# Patient Record
Sex: FEMALE | Race: ASIAN | NOT HISPANIC OR LATINO | URBAN - METROPOLITAN AREA
[De-identification: names, ages, dates, MRNs, and addresses within clinical notes are randomized per-mention and may not be internally consistent; named-entity substitution may affect disease eponyms.]

---

## 2024-01-09 ENCOUNTER — INPATIENT (INPATIENT)
Facility: HOSPITAL | Age: 84
LOS: 8 days | Discharge: NURSING, REHAB OR LONG-TERM CARE | End: 2024-01-18
Attending: HOSPITALIST | Admitting: HOSPITALIST
Payer: MEDICARE

## 2024-01-09 VITALS
RESPIRATION RATE: 16 BRPM | TEMPERATURE: 99 F | OXYGEN SATURATION: 99 % | HEART RATE: 88 BPM | SYSTOLIC BLOOD PRESSURE: 123 MMHG | DIASTOLIC BLOOD PRESSURE: 67 MMHG

## 2024-01-09 DIAGNOSIS — M25.551 PAIN IN RIGHT HIP: ICD-10-CM

## 2024-01-09 DIAGNOSIS — Z95.1 PRESENCE OF AORTOCORONARY BYPASS GRAFT: Chronic | ICD-10-CM

## 2024-01-09 LAB
ALBUMIN SERPL ELPH-MCNC: 3.4 G/DL — SIGNIFICANT CHANGE UP (ref 3.3–5)
ALBUMIN SERPL ELPH-MCNC: 3.4 G/DL — SIGNIFICANT CHANGE UP (ref 3.3–5)
ALP SERPL-CCNC: 68 U/L — SIGNIFICANT CHANGE UP (ref 40–120)
ALP SERPL-CCNC: 68 U/L — SIGNIFICANT CHANGE UP (ref 40–120)
ALT FLD-CCNC: 15 U/L — SIGNIFICANT CHANGE UP (ref 4–33)
ALT FLD-CCNC: 15 U/L — SIGNIFICANT CHANGE UP (ref 4–33)
ANION GAP SERPL CALC-SCNC: 13 MMOL/L — SIGNIFICANT CHANGE UP (ref 7–14)
ANION GAP SERPL CALC-SCNC: 13 MMOL/L — SIGNIFICANT CHANGE UP (ref 7–14)
APPEARANCE UR: CLEAR — SIGNIFICANT CHANGE UP
APPEARANCE UR: CLEAR — SIGNIFICANT CHANGE UP
AST SERPL-CCNC: 44 U/L — HIGH (ref 4–32)
AST SERPL-CCNC: 44 U/L — HIGH (ref 4–32)
B PERT DNA SPEC QL NAA+PROBE: SIGNIFICANT CHANGE UP
B PERT DNA SPEC QL NAA+PROBE: SIGNIFICANT CHANGE UP
B PERT+PARAPERT DNA PNL SPEC NAA+PROBE: SIGNIFICANT CHANGE UP
B PERT+PARAPERT DNA PNL SPEC NAA+PROBE: SIGNIFICANT CHANGE UP
BACTERIA # UR AUTO: NEGATIVE /HPF — SIGNIFICANT CHANGE UP
BACTERIA # UR AUTO: NEGATIVE /HPF — SIGNIFICANT CHANGE UP
BASOPHILS # BLD AUTO: 0.01 K/UL — SIGNIFICANT CHANGE UP (ref 0–0.2)
BASOPHILS # BLD AUTO: 0.01 K/UL — SIGNIFICANT CHANGE UP (ref 0–0.2)
BASOPHILS NFR BLD AUTO: 0.1 % — SIGNIFICANT CHANGE UP (ref 0–2)
BASOPHILS NFR BLD AUTO: 0.1 % — SIGNIFICANT CHANGE UP (ref 0–2)
BILIRUB SERPL-MCNC: 0.4 MG/DL — SIGNIFICANT CHANGE UP (ref 0.2–1.2)
BILIRUB SERPL-MCNC: 0.4 MG/DL — SIGNIFICANT CHANGE UP (ref 0.2–1.2)
BILIRUB UR-MCNC: NEGATIVE — SIGNIFICANT CHANGE UP
BILIRUB UR-MCNC: NEGATIVE — SIGNIFICANT CHANGE UP
BORDETELLA PARAPERTUSSIS (RAPRVP): SIGNIFICANT CHANGE UP
BORDETELLA PARAPERTUSSIS (RAPRVP): SIGNIFICANT CHANGE UP
BUN SERPL-MCNC: 30 MG/DL — HIGH (ref 7–23)
BUN SERPL-MCNC: 30 MG/DL — HIGH (ref 7–23)
C PNEUM DNA SPEC QL NAA+PROBE: SIGNIFICANT CHANGE UP
C PNEUM DNA SPEC QL NAA+PROBE: SIGNIFICANT CHANGE UP
CALCIUM SERPL-MCNC: 8.9 MG/DL — SIGNIFICANT CHANGE UP (ref 8.4–10.5)
CALCIUM SERPL-MCNC: 8.9 MG/DL — SIGNIFICANT CHANGE UP (ref 8.4–10.5)
CAST: 10 /LPF — HIGH (ref 0–4)
CAST: 10 /LPF — HIGH (ref 0–4)
CHLORIDE SERPL-SCNC: 95 MMOL/L — LOW (ref 98–107)
CHLORIDE SERPL-SCNC: 95 MMOL/L — LOW (ref 98–107)
CO2 SERPL-SCNC: 20 MMOL/L — LOW (ref 22–31)
CO2 SERPL-SCNC: 20 MMOL/L — LOW (ref 22–31)
COLOR SPEC: YELLOW — SIGNIFICANT CHANGE UP
COLOR SPEC: YELLOW — SIGNIFICANT CHANGE UP
CREAT SERPL-MCNC: 1.52 MG/DL — HIGH (ref 0.5–1.3)
CREAT SERPL-MCNC: 1.52 MG/DL — HIGH (ref 0.5–1.3)
DIFF PNL FLD: NEGATIVE — SIGNIFICANT CHANGE UP
DIFF PNL FLD: NEGATIVE — SIGNIFICANT CHANGE UP
EGFR: 34 ML/MIN/1.73M2 — LOW
EGFR: 34 ML/MIN/1.73M2 — LOW
EOSINOPHIL # BLD AUTO: 0.09 K/UL — SIGNIFICANT CHANGE UP (ref 0–0.5)
EOSINOPHIL # BLD AUTO: 0.09 K/UL — SIGNIFICANT CHANGE UP (ref 0–0.5)
EOSINOPHIL NFR BLD AUTO: 0.8 % — SIGNIFICANT CHANGE UP (ref 0–6)
EOSINOPHIL NFR BLD AUTO: 0.8 % — SIGNIFICANT CHANGE UP (ref 0–6)
FLUAV SUBTYP SPEC NAA+PROBE: SIGNIFICANT CHANGE UP
FLUAV SUBTYP SPEC NAA+PROBE: SIGNIFICANT CHANGE UP
FLUBV RNA SPEC QL NAA+PROBE: SIGNIFICANT CHANGE UP
FLUBV RNA SPEC QL NAA+PROBE: SIGNIFICANT CHANGE UP
GLUCOSE SERPL-MCNC: 142 MG/DL — HIGH (ref 70–99)
GLUCOSE SERPL-MCNC: 142 MG/DL — HIGH (ref 70–99)
GLUCOSE UR QL: NEGATIVE MG/DL — SIGNIFICANT CHANGE UP
GLUCOSE UR QL: NEGATIVE MG/DL — SIGNIFICANT CHANGE UP
HADV DNA SPEC QL NAA+PROBE: SIGNIFICANT CHANGE UP
HADV DNA SPEC QL NAA+PROBE: SIGNIFICANT CHANGE UP
HCOV 229E RNA SPEC QL NAA+PROBE: SIGNIFICANT CHANGE UP
HCOV 229E RNA SPEC QL NAA+PROBE: SIGNIFICANT CHANGE UP
HCOV HKU1 RNA SPEC QL NAA+PROBE: SIGNIFICANT CHANGE UP
HCOV HKU1 RNA SPEC QL NAA+PROBE: SIGNIFICANT CHANGE UP
HCOV NL63 RNA SPEC QL NAA+PROBE: SIGNIFICANT CHANGE UP
HCOV NL63 RNA SPEC QL NAA+PROBE: SIGNIFICANT CHANGE UP
HCOV OC43 RNA SPEC QL NAA+PROBE: SIGNIFICANT CHANGE UP
HCOV OC43 RNA SPEC QL NAA+PROBE: SIGNIFICANT CHANGE UP
HCT VFR BLD CALC: 26.3 % — LOW (ref 34.5–45)
HCT VFR BLD CALC: 26.3 % — LOW (ref 34.5–45)
HGB BLD-MCNC: 8.6 G/DL — LOW (ref 11.5–15.5)
HGB BLD-MCNC: 8.6 G/DL — LOW (ref 11.5–15.5)
HMPV RNA SPEC QL NAA+PROBE: SIGNIFICANT CHANGE UP
HMPV RNA SPEC QL NAA+PROBE: SIGNIFICANT CHANGE UP
HPIV1 RNA SPEC QL NAA+PROBE: SIGNIFICANT CHANGE UP
HPIV1 RNA SPEC QL NAA+PROBE: SIGNIFICANT CHANGE UP
HPIV2 RNA SPEC QL NAA+PROBE: SIGNIFICANT CHANGE UP
HPIV2 RNA SPEC QL NAA+PROBE: SIGNIFICANT CHANGE UP
HPIV3 RNA SPEC QL NAA+PROBE: SIGNIFICANT CHANGE UP
HPIV3 RNA SPEC QL NAA+PROBE: SIGNIFICANT CHANGE UP
HPIV4 RNA SPEC QL NAA+PROBE: SIGNIFICANT CHANGE UP
HPIV4 RNA SPEC QL NAA+PROBE: SIGNIFICANT CHANGE UP
IANC: 8.02 K/UL — HIGH (ref 1.8–7.4)
IANC: 8.02 K/UL — HIGH (ref 1.8–7.4)
IMM GRANULOCYTES NFR BLD AUTO: 0.6 % — SIGNIFICANT CHANGE UP (ref 0–0.9)
IMM GRANULOCYTES NFR BLD AUTO: 0.6 % — SIGNIFICANT CHANGE UP (ref 0–0.9)
KETONES UR-MCNC: NEGATIVE MG/DL — SIGNIFICANT CHANGE UP
KETONES UR-MCNC: NEGATIVE MG/DL — SIGNIFICANT CHANGE UP
LEUKOCYTE ESTERASE UR-ACNC: NEGATIVE — SIGNIFICANT CHANGE UP
LEUKOCYTE ESTERASE UR-ACNC: NEGATIVE — SIGNIFICANT CHANGE UP
LYMPHOCYTES # BLD AUTO: 0.82 K/UL — LOW (ref 1–3.3)
LYMPHOCYTES # BLD AUTO: 0.82 K/UL — LOW (ref 1–3.3)
LYMPHOCYTES # BLD AUTO: 7.7 % — LOW (ref 13–44)
LYMPHOCYTES # BLD AUTO: 7.7 % — LOW (ref 13–44)
M PNEUMO DNA SPEC QL NAA+PROBE: SIGNIFICANT CHANGE UP
M PNEUMO DNA SPEC QL NAA+PROBE: SIGNIFICANT CHANGE UP
MAGNESIUM SERPL-MCNC: 2.2 MG/DL — SIGNIFICANT CHANGE UP (ref 1.6–2.6)
MAGNESIUM SERPL-MCNC: 2.2 MG/DL — SIGNIFICANT CHANGE UP (ref 1.6–2.6)
MCHC RBC-ENTMCNC: 31.3 PG — SIGNIFICANT CHANGE UP (ref 27–34)
MCHC RBC-ENTMCNC: 31.3 PG — SIGNIFICANT CHANGE UP (ref 27–34)
MCHC RBC-ENTMCNC: 32.7 GM/DL — SIGNIFICANT CHANGE UP (ref 32–36)
MCHC RBC-ENTMCNC: 32.7 GM/DL — SIGNIFICANT CHANGE UP (ref 32–36)
MCV RBC AUTO: 95.6 FL — SIGNIFICANT CHANGE UP (ref 80–100)
MCV RBC AUTO: 95.6 FL — SIGNIFICANT CHANGE UP (ref 80–100)
MONOCYTES # BLD AUTO: 1.6 K/UL — HIGH (ref 0–0.9)
MONOCYTES # BLD AUTO: 1.6 K/UL — HIGH (ref 0–0.9)
MONOCYTES NFR BLD AUTO: 15.1 % — HIGH (ref 2–14)
MONOCYTES NFR BLD AUTO: 15.1 % — HIGH (ref 2–14)
NEUTROPHILS # BLD AUTO: 8.02 K/UL — HIGH (ref 1.8–7.4)
NEUTROPHILS # BLD AUTO: 8.02 K/UL — HIGH (ref 1.8–7.4)
NEUTROPHILS NFR BLD AUTO: 75.7 % — SIGNIFICANT CHANGE UP (ref 43–77)
NEUTROPHILS NFR BLD AUTO: 75.7 % — SIGNIFICANT CHANGE UP (ref 43–77)
NITRITE UR-MCNC: NEGATIVE — SIGNIFICANT CHANGE UP
NITRITE UR-MCNC: NEGATIVE — SIGNIFICANT CHANGE UP
NRBC # BLD: 0 /100 WBCS — SIGNIFICANT CHANGE UP (ref 0–0)
NRBC # BLD: 0 /100 WBCS — SIGNIFICANT CHANGE UP (ref 0–0)
NRBC # FLD: 0 K/UL — SIGNIFICANT CHANGE UP (ref 0–0)
NRBC # FLD: 0 K/UL — SIGNIFICANT CHANGE UP (ref 0–0)
PH UR: 6 — SIGNIFICANT CHANGE UP (ref 5–8)
PH UR: 6 — SIGNIFICANT CHANGE UP (ref 5–8)
PLATELET # BLD AUTO: 192 K/UL — SIGNIFICANT CHANGE UP (ref 150–400)
PLATELET # BLD AUTO: 192 K/UL — SIGNIFICANT CHANGE UP (ref 150–400)
POTASSIUM SERPL-MCNC: 4.6 MMOL/L — SIGNIFICANT CHANGE UP (ref 3.5–5.3)
POTASSIUM SERPL-MCNC: 4.6 MMOL/L — SIGNIFICANT CHANGE UP (ref 3.5–5.3)
POTASSIUM SERPL-SCNC: 4.6 MMOL/L — SIGNIFICANT CHANGE UP (ref 3.5–5.3)
POTASSIUM SERPL-SCNC: 4.6 MMOL/L — SIGNIFICANT CHANGE UP (ref 3.5–5.3)
PROT SERPL-MCNC: 8.1 G/DL — SIGNIFICANT CHANGE UP (ref 6–8.3)
PROT SERPL-MCNC: 8.1 G/DL — SIGNIFICANT CHANGE UP (ref 6–8.3)
PROT UR-MCNC: 30 MG/DL
PROT UR-MCNC: 30 MG/DL
RAPID RVP RESULT: SIGNIFICANT CHANGE UP
RAPID RVP RESULT: SIGNIFICANT CHANGE UP
RBC # BLD: 2.75 M/UL — LOW (ref 3.8–5.2)
RBC # BLD: 2.75 M/UL — LOW (ref 3.8–5.2)
RBC # FLD: 13 % — SIGNIFICANT CHANGE UP (ref 10.3–14.5)
RBC # FLD: 13 % — SIGNIFICANT CHANGE UP (ref 10.3–14.5)
RBC CASTS # UR COMP ASSIST: 0 /HPF — SIGNIFICANT CHANGE UP (ref 0–4)
RBC CASTS # UR COMP ASSIST: 0 /HPF — SIGNIFICANT CHANGE UP (ref 0–4)
REVIEW: SIGNIFICANT CHANGE UP
REVIEW: SIGNIFICANT CHANGE UP
RSV RNA SPEC QL NAA+PROBE: SIGNIFICANT CHANGE UP
RSV RNA SPEC QL NAA+PROBE: SIGNIFICANT CHANGE UP
RV+EV RNA SPEC QL NAA+PROBE: SIGNIFICANT CHANGE UP
RV+EV RNA SPEC QL NAA+PROBE: SIGNIFICANT CHANGE UP
SARS-COV-2 RNA SPEC QL NAA+PROBE: SIGNIFICANT CHANGE UP
SARS-COV-2 RNA SPEC QL NAA+PROBE: SIGNIFICANT CHANGE UP
SODIUM SERPL-SCNC: 128 MMOL/L — LOW (ref 135–145)
SODIUM SERPL-SCNC: 128 MMOL/L — LOW (ref 135–145)
SP GR SPEC: 1.01 — SIGNIFICANT CHANGE UP (ref 1–1.03)
SP GR SPEC: 1.01 — SIGNIFICANT CHANGE UP (ref 1–1.03)
SQUAMOUS # UR AUTO: 1 /HPF — SIGNIFICANT CHANGE UP (ref 0–5)
SQUAMOUS # UR AUTO: 1 /HPF — SIGNIFICANT CHANGE UP (ref 0–5)
UROBILINOGEN FLD QL: 0.2 MG/DL — SIGNIFICANT CHANGE UP (ref 0.2–1)
UROBILINOGEN FLD QL: 0.2 MG/DL — SIGNIFICANT CHANGE UP (ref 0.2–1)
WBC # BLD: 10.6 K/UL — HIGH (ref 3.8–10.5)
WBC # BLD: 10.6 K/UL — HIGH (ref 3.8–10.5)
WBC # FLD AUTO: 10.6 K/UL — HIGH (ref 3.8–10.5)
WBC # FLD AUTO: 10.6 K/UL — HIGH (ref 3.8–10.5)
WBC UR QL: 0 /HPF — SIGNIFICANT CHANGE UP (ref 0–5)
WBC UR QL: 0 /HPF — SIGNIFICANT CHANGE UP (ref 0–5)

## 2024-01-09 PROCEDURE — 99285 EMERGENCY DEPT VISIT HI MDM: CPT

## 2024-01-09 PROCEDURE — 73562 X-RAY EXAM OF KNEE 3: CPT | Mod: 26,50

## 2024-01-09 PROCEDURE — 72170 X-RAY EXAM OF PELVIS: CPT | Mod: 26

## 2024-01-09 PROCEDURE — 73564 X-RAY EXAM KNEE 4 OR MORE: CPT | Mod: 26,50

## 2024-01-09 PROCEDURE — 72192 CT PELVIS W/O DYE: CPT | Mod: 26,MA

## 2024-01-09 PROCEDURE — 99223 1ST HOSP IP/OBS HIGH 75: CPT

## 2024-01-09 PROCEDURE — 71045 X-RAY EXAM CHEST 1 VIEW: CPT | Mod: 26

## 2024-01-09 RX ORDER — LIDOCAINE 4 G/100G
1 CREAM TOPICAL ONCE
Refills: 0 | Status: COMPLETED | OUTPATIENT
Start: 2024-01-09 | End: 2024-01-09

## 2024-01-09 RX ORDER — SODIUM CHLORIDE 9 MG/ML
1000 INJECTION INTRAMUSCULAR; INTRAVENOUS; SUBCUTANEOUS ONCE
Refills: 0 | Status: COMPLETED | OUTPATIENT
Start: 2024-01-09 | End: 2024-01-09

## 2024-01-09 RX ORDER — ACETAMINOPHEN 500 MG
1000 TABLET ORAL ONCE
Refills: 0 | Status: COMPLETED | OUTPATIENT
Start: 2024-01-09 | End: 2024-01-09

## 2024-01-09 RX ADMIN — SODIUM CHLORIDE 1000 MILLILITER(S): 9 INJECTION INTRAMUSCULAR; INTRAVENOUS; SUBCUTANEOUS at 17:38

## 2024-01-09 RX ADMIN — LIDOCAINE 1 PATCH: 4 CREAM TOPICAL at 16:19

## 2024-01-09 RX ADMIN — Medication 400 MILLIGRAM(S): at 16:19

## 2024-01-09 NOTE — ED PROVIDER NOTE - CLINICAL SUMMARY MEDICAL DECISION MAKING FREE TEXT BOX
83-year-old female presenting to the emergency department with concern for 1 month of worsening bilateral hip pain which was followed by bilateral knee pain, and subsequently generalized weakness and inability to ambulate as of today.  Also was diagnosed with COVID approximately 1 week ago with cough and congestion.  On exam is generally weak with reproducible pain in the hips and knees on ranging however no deformities no swelling no redness to joints, no limit to range, no localizing neurodeficit.  Given significant change in capacity to achieve ADLs we will check labs urine viral swab x-rays, To eval for DIF including but not limited to infections electrolyte disturbance organ dysfunction fractures, CT to eval for occult hip fractures, denies any particular trauma that she can recall.  Patient agreeable to plan.

## 2024-01-09 NOTE — H&P ADULT - PROBLEM SELECTOR PLAN 1
Likely osteoarthritis, but cannot exclude inflammatory condition.    - will check DONTAE, dsDNA, RF, and CCP ab.  - PT eval  - PRN ibuprofen  - Possible rheumatology consult in am

## 2024-01-09 NOTE — ED PROVIDER NOTE - OBJECTIVE STATEMENT
83-year-old female history of CABG, presenting to the emergency department with concern for generalized weakness and hip/knee pain.  Per patient and daughter, approximately 1 month ago patient began to notice bilateral hip pain, became progressively worse, was treated with Motrin and Tylenol, has seen improvement with Motrin with Tylenol.  Over the last few days pain worsened significantly now also felt in the bilateral knees, today was unable to walk.  Patient and daughter report up until a few days ago was completing all of her ADLs, fully ambulatory without assist and lives with her  alone.  Reports last week was diagnosed with COVID and has had persistent cough and congestion ever since then.  Had a repeat COVID test today at home which was negative.  No fevers though does report chills, no shortness of breath or chest pain, no abdominal pain vomiting or diarrhea.  No edema, no recent falls, no numbness, headache or vision changes.

## 2024-01-09 NOTE — H&P ADULT - NSHPLABSRESULTS_GEN_ALL_CORE
8.6    10.60 )-----------( 192      ( 2024 16:39 )             26.3     128<L>  |  95<L>  |  30<H>  ----------------------------<  142<H>       4.6   |  20<L>  |  1.52<H>    Ca    8.9      2024 16:39  Mg     2.20         TPro  8.1  /  Alb  3.4  /  TBili  0.4  /  DBili  x   /  AST  44<H>  /  ALT  15  /  AlkPhos  68                              Urinalysis Basic - ( 2024 22:00 )  Color: Yellow / Appearance: Clear / S.012 / pH: 6.0  Gluc: Negative mg/dL / Ketone: Negative mg/dL  / Bili: Negative / Urobili: 0.2 mg/dL   Blood: Negative / Protein: 30 mg/dL / Nitrite: Negative   Leuk Esterase: Negative / RBC: 0 /HPF / WBC 0 /HPF   Sq Epi: x / Non Sq Epi: x / Bacteria: Negative /HPF        < from: CT Pelvis Bony Only No Cont (24 @ 17:06) >    No fracture.  Degenerative changes.  Small erosion at the left adductor insertion at the left anterior greater   trochanter. Possibly the sequela of remote injury or inflammatory   arthropathy.    < end of copied text >

## 2024-01-09 NOTE — ED PROVIDER NOTE - PHYSICAL EXAMINATION
GEN - NAD; thin, nontoxic; A+O x3   HEAD - NC/AT   EYES- PERRL, EOMI  ENT: Airway patent, mmm, Oral cavity and pharynx normal. No inflammation, swelling, exudate, or lesions.    NECK: Neck supple  PULMONARY - CTA b/l, symmetric breath sounds, unlabored.   CARDIAC -s1s2, RRR, no M,G,R  ABDOMEN - +BS, ND, NT, soft, no guarding  BACK - no CVA tenderness, Normal  spine   EXTREMITIES - FROM to b/l ue and le, hips/pelvis stable, +ttp to r. and l. si joints, from to hips/knees b/l, ext mechs intact b/l, neg ant/post drawer b/l, no laxity to joints, Pain reproduced to hips and knees on ranging. symmetric pulses, capillary refill < 2 seconds, no edema   SKIN - no rash or bruising   NEUROLOGIC - ao3, face symmetric, speech clear, sensory intact, generally weak though moving all ext equally, unable to bear weight 2/2 pain/generalized weakness

## 2024-01-09 NOTE — ED ADULT NURSE NOTE - NSFALLRISKINTERV_ED_ALL_ED
Assistance OOB with selected safe patient handling equipment if applicable/Assistance with ambulation/Communicate fall risk and risk factors to all staff, patient, and family/Provide visual cue: yellow wristband, yellow gown, etc/Reinforce activity limits and safety measures with patient and family/Call bell, personal items and telephone in reach/Instruct patient to call for assistance before getting out of bed/chair/stretcher/Non-slip footwear applied when patient is off stretcher/[place] to call system/Physically safe environment - no spills, clutter or unnecessary equipment/Purposeful Proactive Rounding/Room/bathroom lighting operational, light cord in reach Assistance OOB with selected safe patient handling equipment if applicable/Assistance with ambulation/Communicate fall risk and risk factors to all staff, patient, and family/Provide visual cue: yellow wristband, yellow gown, etc/Reinforce activity limits and safety measures with patient and family/Call bell, personal items and telephone in reach/Instruct patient to call for assistance before getting out of bed/chair/stretcher/Non-slip footwear applied when patient is off stretcher/[place] to call system/Physically safe environment - no spills, clutter or unnecessary equipment/Purposeful Proactive Rounding/Room/bathroom lighting operational, light cord in reach

## 2024-01-09 NOTE — ED ADULT NURSE NOTE - OBJECTIVE STATEMENT
Pt is a 84 y/o Female, A&Ox4, ambulatory at baseline with a PHx of CABG, presenting to the ED with c/o b/l knee and hip pain x 1 month. Pt's daughter at bedside states pain worsening over past few days. Pt reports difficulty ambulating and reports being carried into car today by grandson. Pt jumps when palpating knees. Swelling noted to left knee when compared to right knee. Respirations even and unlabored, chest rise equal b/l. Pt denies chest pain, SOB, fever, cough, chills, abdominal pain, N/V/D, h/a, dizziness, numbness/tingling or any urinary symptoms at this time. No acute distress noted. 22g IVL placed in LAC. Labs collected and sent. Medication administered as ordered, see MAR. Safety maintained throughout.

## 2024-01-09 NOTE — ED ADULT TRIAGE NOTE - CHIEF COMPLAINT QUOTE
pt c/o lower back pain, b/l hip and  b/l lower extremity x 1 month. pt states pain is partially relieved with motrin. pt denies injury. reports difficulty ambulating due to pain.

## 2024-01-09 NOTE — H&P ADULT - NSHPPHYSICALEXAM_GEN_ALL_CORE
Vital Signs Last 24 Hrs  T(C): 36.9 (10 Kwame 2024 00:11), Max: 37 (09 Jan 2024 14:17)  T(F): 98.5 (10 Kwame 2024 00:11), Max: 98.6 (09 Jan 2024 14:17)  HR: 89 (10 Kwame 2024 00:11) (88 - 89)  BP: 135/61 (10 Kwame 2024 00:11) (123/67 - 135/61)  BP(mean): --  RR: 18 (10 Kwame 2024 00:11) (16 - 18)  SpO2: 99% (10 Kwame 2024 00:11) (99% - 99%)    Parameters below as of 10 Kwame 2024 00:11  Patient On (Oxygen Delivery Method): room air        PHYSICAL EXAM:  GENERAL: No Acute Distress  EYES: conjunctiva and sclera clear  ENMT: Moist mucous membranes   NECK: Supple  PULMONARY: Clear to auscultation bilaterally  CARDIAC: Regular rate and rhythm; No murmurs, rubs, or gallops  GASTROINTESTINAL: Abdomen soft, Nontender, Nondistended; Bowel sounds normal  EXTREMITIES:   No clubbing, cyanosis, or pedal edema  PSYCH: Normal Affect, Normal Behavior  NEUROLOGY:   - Mental status A&O x 3  SKIN: No rashes or lesions  MUSCULOSKELETAL: bilateral knee tenderness and effusion

## 2024-01-09 NOTE — H&P ADULT - HISTORY OF PRESENT ILLNESS
84 y/o  F with CAD s/p CABG, HLD, and chronic hep B p/w bilateral knee pain.  Pt reports bilateral hip pain for about 1 month.  She saw her PMD and was referred to an orthopedist.  Per daughter, pt had imaging and testing for autoimmune diseases, but was told the tests were normal.  Over the past few days, pt has had worsening knee pain with swelling of bilateral knees.  She has difficulty standing and walking 2/2 the pain, and has had difficulty getting around her home to perform ADL's.  She reports the joints are not warm, and she has not been having fever or chills.  She takes ibuprofen which provides partial relief.  Pt has history of similar shoulder pain in the past.  Daughter also states pt recently saw a dermatologist for rash on her arms and face, but is unsure about the diagnosis.      In the ED, pt was given tylenol and lidocaine patch.

## 2024-01-10 DIAGNOSIS — B19.10 UNSPECIFIED VIRAL HEPATITIS B WITHOUT HEPATIC COMA: ICD-10-CM

## 2024-01-10 DIAGNOSIS — E87.1 HYPO-OSMOLALITY AND HYPONATREMIA: ICD-10-CM

## 2024-01-10 DIAGNOSIS — I25.10 ATHEROSCLEROTIC HEART DISEASE OF NATIVE CORONARY ARTERY WITHOUT ANGINA PECTORIS: ICD-10-CM

## 2024-01-10 DIAGNOSIS — M25.561 PAIN IN RIGHT KNEE: ICD-10-CM

## 2024-01-10 DIAGNOSIS — N28.9 DISORDER OF KIDNEY AND URETER, UNSPECIFIED: ICD-10-CM

## 2024-01-10 DIAGNOSIS — N17.9 ACUTE KIDNEY FAILURE, UNSPECIFIED: ICD-10-CM

## 2024-01-10 LAB
ANION GAP SERPL CALC-SCNC: 11 MMOL/L — SIGNIFICANT CHANGE UP (ref 7–14)
ANION GAP SERPL CALC-SCNC: 11 MMOL/L — SIGNIFICANT CHANGE UP (ref 7–14)
BASOPHILS # BLD AUTO: 0.02 K/UL — SIGNIFICANT CHANGE UP (ref 0–0.2)
BASOPHILS # BLD AUTO: 0.02 K/UL — SIGNIFICANT CHANGE UP (ref 0–0.2)
BASOPHILS NFR BLD AUTO: 0.2 % — SIGNIFICANT CHANGE UP (ref 0–2)
BASOPHILS NFR BLD AUTO: 0.2 % — SIGNIFICANT CHANGE UP (ref 0–2)
BUN SERPL-MCNC: 24 MG/DL — HIGH (ref 7–23)
BUN SERPL-MCNC: 24 MG/DL — HIGH (ref 7–23)
CALCIUM SERPL-MCNC: 8.5 MG/DL — SIGNIFICANT CHANGE UP (ref 8.4–10.5)
CALCIUM SERPL-MCNC: 8.5 MG/DL — SIGNIFICANT CHANGE UP (ref 8.4–10.5)
CHLORIDE SERPL-SCNC: 100 MMOL/L — SIGNIFICANT CHANGE UP (ref 98–107)
CHLORIDE SERPL-SCNC: 100 MMOL/L — SIGNIFICANT CHANGE UP (ref 98–107)
CO2 SERPL-SCNC: 21 MMOL/L — LOW (ref 22–31)
CO2 SERPL-SCNC: 21 MMOL/L — LOW (ref 22–31)
CREAT SERPL-MCNC: 1.45 MG/DL — HIGH (ref 0.5–1.3)
CREAT SERPL-MCNC: 1.45 MG/DL — HIGH (ref 0.5–1.3)
DSDNA AB SER-ACNC: <12 IU/ML — SIGNIFICANT CHANGE UP
DSDNA AB SER-ACNC: <12 IU/ML — SIGNIFICANT CHANGE UP
EGFR: 36 ML/MIN/1.73M2 — LOW
EGFR: 36 ML/MIN/1.73M2 — LOW
EOSINOPHIL # BLD AUTO: 0.2 K/UL — SIGNIFICANT CHANGE UP (ref 0–0.5)
EOSINOPHIL # BLD AUTO: 0.2 K/UL — SIGNIFICANT CHANGE UP (ref 0–0.5)
EOSINOPHIL NFR BLD AUTO: 1.7 % — SIGNIFICANT CHANGE UP (ref 0–6)
EOSINOPHIL NFR BLD AUTO: 1.7 % — SIGNIFICANT CHANGE UP (ref 0–6)
GLUCOSE SERPL-MCNC: 134 MG/DL — HIGH (ref 70–99)
GLUCOSE SERPL-MCNC: 134 MG/DL — HIGH (ref 70–99)
HCT VFR BLD CALC: 25.9 % — LOW (ref 34.5–45)
HCT VFR BLD CALC: 25.9 % — LOW (ref 34.5–45)
HGB BLD-MCNC: 8.6 G/DL — LOW (ref 11.5–15.5)
HGB BLD-MCNC: 8.6 G/DL — LOW (ref 11.5–15.5)
IANC: 9.23 K/UL — HIGH (ref 1.8–7.4)
IANC: 9.23 K/UL — HIGH (ref 1.8–7.4)
IMM GRANULOCYTES NFR BLD AUTO: 0.6 % — SIGNIFICANT CHANGE UP (ref 0–0.9)
IMM GRANULOCYTES NFR BLD AUTO: 0.6 % — SIGNIFICANT CHANGE UP (ref 0–0.9)
LYMPHOCYTES # BLD AUTO: 0.99 K/UL — LOW (ref 1–3.3)
LYMPHOCYTES # BLD AUTO: 0.99 K/UL — LOW (ref 1–3.3)
LYMPHOCYTES # BLD AUTO: 8.3 % — LOW (ref 13–44)
LYMPHOCYTES # BLD AUTO: 8.3 % — LOW (ref 13–44)
MAGNESIUM SERPL-MCNC: 2 MG/DL — SIGNIFICANT CHANGE UP (ref 1.6–2.6)
MAGNESIUM SERPL-MCNC: 2 MG/DL — SIGNIFICANT CHANGE UP (ref 1.6–2.6)
MCHC RBC-ENTMCNC: 31.2 PG — SIGNIFICANT CHANGE UP (ref 27–34)
MCHC RBC-ENTMCNC: 31.2 PG — SIGNIFICANT CHANGE UP (ref 27–34)
MCHC RBC-ENTMCNC: 33.2 GM/DL — SIGNIFICANT CHANGE UP (ref 32–36)
MCHC RBC-ENTMCNC: 33.2 GM/DL — SIGNIFICANT CHANGE UP (ref 32–36)
MCV RBC AUTO: 93.8 FL — SIGNIFICANT CHANGE UP (ref 80–100)
MCV RBC AUTO: 93.8 FL — SIGNIFICANT CHANGE UP (ref 80–100)
MONOCYTES # BLD AUTO: 1.4 K/UL — HIGH (ref 0–0.9)
MONOCYTES # BLD AUTO: 1.4 K/UL — HIGH (ref 0–0.9)
MONOCYTES NFR BLD AUTO: 11.8 % — SIGNIFICANT CHANGE UP (ref 2–14)
MONOCYTES NFR BLD AUTO: 11.8 % — SIGNIFICANT CHANGE UP (ref 2–14)
NEUTROPHILS # BLD AUTO: 9.23 K/UL — HIGH (ref 1.8–7.4)
NEUTROPHILS # BLD AUTO: 9.23 K/UL — HIGH (ref 1.8–7.4)
NEUTROPHILS NFR BLD AUTO: 77.4 % — HIGH (ref 43–77)
NEUTROPHILS NFR BLD AUTO: 77.4 % — HIGH (ref 43–77)
NRBC # BLD: 0 /100 WBCS — SIGNIFICANT CHANGE UP (ref 0–0)
NRBC # BLD: 0 /100 WBCS — SIGNIFICANT CHANGE UP (ref 0–0)
NRBC # FLD: 0 K/UL — SIGNIFICANT CHANGE UP (ref 0–0)
NRBC # FLD: 0 K/UL — SIGNIFICANT CHANGE UP (ref 0–0)
PHOSPHATE SERPL-MCNC: 3 MG/DL — SIGNIFICANT CHANGE UP (ref 2.5–4.5)
PHOSPHATE SERPL-MCNC: 3 MG/DL — SIGNIFICANT CHANGE UP (ref 2.5–4.5)
PLATELET # BLD AUTO: 203 K/UL — SIGNIFICANT CHANGE UP (ref 150–400)
PLATELET # BLD AUTO: 203 K/UL — SIGNIFICANT CHANGE UP (ref 150–400)
POTASSIUM SERPL-MCNC: 4.6 MMOL/L — SIGNIFICANT CHANGE UP (ref 3.5–5.3)
POTASSIUM SERPL-MCNC: 4.6 MMOL/L — SIGNIFICANT CHANGE UP (ref 3.5–5.3)
POTASSIUM SERPL-SCNC: 4.6 MMOL/L — SIGNIFICANT CHANGE UP (ref 3.5–5.3)
POTASSIUM SERPL-SCNC: 4.6 MMOL/L — SIGNIFICANT CHANGE UP (ref 3.5–5.3)
RBC # BLD: 2.76 M/UL — LOW (ref 3.8–5.2)
RBC # BLD: 2.76 M/UL — LOW (ref 3.8–5.2)
RBC # FLD: 13.2 % — SIGNIFICANT CHANGE UP (ref 10.3–14.5)
RBC # FLD: 13.2 % — SIGNIFICANT CHANGE UP (ref 10.3–14.5)
RHEUMATOID FACT SERPL-ACNC: 13 IU/ML — SIGNIFICANT CHANGE UP (ref 0–13)
RHEUMATOID FACT SERPL-ACNC: 13 IU/ML — SIGNIFICANT CHANGE UP (ref 0–13)
SODIUM SERPL-SCNC: 132 MMOL/L — LOW (ref 135–145)
SODIUM SERPL-SCNC: 132 MMOL/L — LOW (ref 135–145)
WBC # BLD: 11.91 K/UL — HIGH (ref 3.8–10.5)
WBC # BLD: 11.91 K/UL — HIGH (ref 3.8–10.5)
WBC # FLD AUTO: 11.91 K/UL — HIGH (ref 3.8–10.5)
WBC # FLD AUTO: 11.91 K/UL — HIGH (ref 3.8–10.5)

## 2024-01-10 PROCEDURE — 99232 SBSQ HOSP IP/OBS MODERATE 35: CPT

## 2024-01-10 RX ORDER — FAMOTIDINE 10 MG/ML
20 INJECTION INTRAVENOUS DAILY
Refills: 0 | Status: DISCONTINUED | OUTPATIENT
Start: 2024-01-10 | End: 2024-01-18

## 2024-01-10 RX ORDER — ENTECAVIR 0.5 MG/1
0.5 TABLET ORAL DAILY
Refills: 0 | Status: DISCONTINUED | OUTPATIENT
Start: 2024-01-10 | End: 2024-01-10

## 2024-01-10 RX ORDER — SODIUM CHLORIDE 9 MG/ML
1000 INJECTION INTRAMUSCULAR; INTRAVENOUS; SUBCUTANEOUS
Refills: 0 | Status: DISCONTINUED | OUTPATIENT
Start: 2024-01-10 | End: 2024-01-13

## 2024-01-10 RX ORDER — ENTECAVIR 0.5 MG/1
0.5 TABLET ORAL
Refills: 0 | Status: DISCONTINUED | OUTPATIENT
Start: 2024-01-10 | End: 2024-01-18

## 2024-01-10 RX ORDER — POLYETHYLENE GLYCOL 3350 17 G/17G
17 POWDER, FOR SOLUTION ORAL DAILY
Refills: 0 | Status: DISCONTINUED | OUTPATIENT
Start: 2024-01-10 | End: 2024-01-18

## 2024-01-10 RX ORDER — LANOLIN ALCOHOL/MO/W.PET/CERES
3 CREAM (GRAM) TOPICAL AT BEDTIME
Refills: 0 | Status: DISCONTINUED | OUTPATIENT
Start: 2024-01-10 | End: 2024-01-18

## 2024-01-10 RX ORDER — ATORVASTATIN CALCIUM 80 MG/1
80 TABLET, FILM COATED ORAL AT BEDTIME
Refills: 0 | Status: DISCONTINUED | OUTPATIENT
Start: 2024-01-10 | End: 2024-01-18

## 2024-01-10 RX ORDER — ACETAMINOPHEN 500 MG
650 TABLET ORAL EVERY 6 HOURS
Refills: 0 | Status: DISCONTINUED | OUTPATIENT
Start: 2024-01-10 | End: 2024-01-18

## 2024-01-10 RX ORDER — ASPIRIN/CALCIUM CARB/MAGNESIUM 324 MG
81 TABLET ORAL DAILY
Refills: 0 | Status: DISCONTINUED | OUTPATIENT
Start: 2024-01-10 | End: 2024-01-18

## 2024-01-10 RX ORDER — ERGOCALCIFEROL 1.25 MG/1
1 CAPSULE ORAL
Refills: 0 | DISCHARGE

## 2024-01-10 RX ORDER — IBUPROFEN 200 MG
400 TABLET ORAL THREE TIMES A DAY
Refills: 0 | Status: DISCONTINUED | OUTPATIENT
Start: 2024-01-10 | End: 2024-01-10

## 2024-01-10 RX ADMIN — Medication 81 MILLIGRAM(S): at 10:42

## 2024-01-10 RX ADMIN — ENTECAVIR 0.5 MILLIGRAM(S): 0.5 TABLET ORAL at 06:31

## 2024-01-10 RX ADMIN — SODIUM CHLORIDE 75 MILLILITER(S): 9 INJECTION INTRAMUSCULAR; INTRAVENOUS; SUBCUTANEOUS at 22:03

## 2024-01-10 RX ADMIN — Medication 650 MILLIGRAM(S): at 06:31

## 2024-01-10 RX ADMIN — ATORVASTATIN CALCIUM 80 MILLIGRAM(S): 80 TABLET, FILM COATED ORAL at 23:34

## 2024-01-10 RX ADMIN — FAMOTIDINE 20 MILLIGRAM(S): 10 INJECTION INTRAVENOUS at 10:42

## 2024-01-10 RX ADMIN — POLYETHYLENE GLYCOL 3350 17 GRAM(S): 17 POWDER, FOR SOLUTION ORAL at 16:34

## 2024-01-10 NOTE — PHYSICAL THERAPY INITIAL EVALUATION ADULT - PATIENT PROFILE REVIEW, REHAB EVAL
ACTIVITY: Ambulate with Assistance; spoke with NAOMI Gonzalez prior to PT evaluation--> Pt OK for PT consult/OOB activity; vitals taken; /45mmHg/yes

## 2024-01-10 NOTE — PROGRESS NOTE ADULT - SUBJECTIVE AND OBJECTIVE BOX
PROGRESS NOTE:     Patient is a 83y old  Female who presents with a chief complaint of Knee pain (09 Jan 2024 23:26)      SUBJECTIVE / OVERNIGHT EVENTS: Knee pain is better.     ADDITIONAL REVIEW OF SYSTEMS:    MEDICATIONS  (STANDING):  aspirin enteric coated 81 milliGRAM(s) Oral daily  atorvastatin 80 milliGRAM(s) Oral at bedtime  entecavir 0.5 milliGRAM(s) Oral every 72 hours  famotidine    Tablet 20 milliGRAM(s) Oral daily  sodium chloride 0.9%. 1000 milliLiter(s) (75 mL/Hr) IV Continuous <Continuous>    MEDICATIONS  (PRN):  acetaminophen     Tablet .. 650 milliGRAM(s) Oral every 6 hours PRN Temp greater or equal to 38C (100.4F), Mild Pain (1 - 3)  ibuprofen  Tablet. 400 milliGRAM(s) Oral three times a day PRN Severe Pain (7 - 10)  melatonin 3 milliGRAM(s) Oral at bedtime PRN Insomnia      CAPILLARY BLOOD GLUCOSE        I&O's Summary      PHYSICAL EXAM:  Vital Signs Last 24 Hrs  T(C): 36.8 (10 Kwame 2024 10:15), Max: 37 (09 Jan 2024 14:17)  T(F): 98.3 (10 Kwame 2024 10:15), Max: 98.6 (09 Jan 2024 14:17)  HR: 83 (10 Kwame 2024 10:15) (83 - 92)  BP: 117/47 (10 Kwame 2024 10:15) (117/47 - 135/61)  BP(mean): --  RR: 18 (10 Kwame 2024 10:15) (16 - 18)  SpO2: 97% (10 Kwame 2024 10:15) (97% - 100%)    Parameters below as of 10 Kwame 2024 10:15  Patient On (Oxygen Delivery Method): room air      GENERAL: No Acute Distress  EYES: conjunctiva and sclera clear  NECK: Supple  PULMONARY: Clear to auscultation bilaterally  CARDIAC: Regular rate and rhythm; No murmurs, rubs, or gallops  GASTROINTESTINAL: Abdomen soft, Nontender, Nondistended; Bowel sounds normal  EXTREMITIES:   No clubbing, cyanosis, or pedal edema. No knee tenderness or erythema    PSYCH: Normal Affect, Normal Behavior  NEUROLOGY: Mental status A&O x 3, moves all extremities   SKIN: No rashes or lesions    LABS:                        8.6    11.91 )-----------( 203      ( 10 Kwame 2024 06:45 )             25.9     01-10    132<L>  |  100  |  24<H>  ----------------------------<  134<H>  4.6   |  21<L>  |  1.45<H>    Ca    8.5      10 Kwame 2024 06:45  Phos  3.0     01-10  Mg     2.00     01-10    TPro  8.1  /  Alb  3.4  /  TBili  0.4  /  DBili  x   /  AST  44<H>  /  ALT  15  /  AlkPhos  68  01-09          Urinalysis Basic - ( 10 Kwame 2024 06:45 )    Color: x / Appearance: x / SG: x / pH: x  Gluc: 134 mg/dL / Ketone: x  / Bili: x / Urobili: x   Blood: x / Protein: x / Nitrite: x   Leuk Esterase: x / RBC: x / WBC x   Sq Epi: x / Non Sq Epi: x / Bacteria: x          RADIOLOGY & ADDITIONAL TESTS:  Results Reviewed:   Imaging Personally Reviewed:  Electrocardiogram Personally Reviewed:    COORDINATION OF CARE:  Care Discussed with Consultants/Other Providers [Y/N]:  Prior or Outpatient Records Reviewed [Y/N]:

## 2024-01-10 NOTE — PHYSICAL THERAPY INITIAL EVALUATION ADULT - ADDITIONAL COMMENTS
UROLOGY OFFICE VISIT NOTE-MALE    UROLOGY CHIEF COMPLAINT  Chief Complaint   Patient presents with   • Follow-up     PROSTATITIS , URINARY RETENTION        ASSESSMENT  1. Stable lower urinary tract symptoms on tamsulosin and finasteride with complete bladder emptying  2. Recurrent urinary tract infections, likely UTI today  3. Nonlocalizing mid toward right-sided abdominal pain for the last 2 weeks with associated 20 lb weight loss over the last several months.  Probably some constipation.  4. History of elevated PSA felt secondary to BPH.  Negative biopsy in the past.  Appropriate response to finasteride.    PLAN  Discussed.  Recommend CBC, CMP, lipase level today.  Will order CT scan of the abdomen and pelvis.  Urine for culture, treat appropriately.  Continue tamsulosin and finasteride.  Metamucil daily.  He should make an appointment to see Bibiana Hector as soon as possible.    SUBJECTIVE  Graciela Franklin is a 80 year old male who presents in follow-up for obstructive BPH and recurring urinary retention.  He remains on tamsulosin and finasteride.  He is voiding about every 3-4 hours and has nocturia x3.  His urine voids are small..  He denies dysuria or hematuria.  His urine flow is moderate.  He has history of recurrent urinary tract infections.  He has poorly controlled diabetes.  He complains of a 20 lb weight loss over the last several months.  He has anorexia.  His bowels are somewhat constipated.  Over the last 2 weeks he has been having increasing problems with mid toward right-sided abdominal pain.  There is no nausea or vomiting.  He denies fever.  There is no hematochezia.    ACTIVE PROBLEMS  Patient Active Problem List   Diagnosis   • Essential hypertension   • Urinary retention   • Elevated PSA   • Prostatitis, chronic   • Recurrent UTI   • BPH with obstruction/lower urinary tract symptoms   • Prostate nodule   • Type 2 diabetes mellitus without complication, without long-term current use of  insulin (CMS/HCC)       HISTORIES  Past Medical History:   Diagnosis Date   • Elevated PSA    • Urinary retention        Past Surgical History:   Procedure Laterality Date   • Us prostate w biopsy  11/12/2015       History reviewed. No pertinent family history.    Social History     Socioeconomic History   • Marital status: /Civil Union     Spouse name: Not on file   • Number of children: Not on file   • Years of education: Not on file   • Highest education level: Not on file   Occupational History   • Not on file   Tobacco Use   • Smoking status: Never Smoker   • Smokeless tobacco: Never Used   Substance and Sexual Activity   • Alcohol use: No     Alcohol/week: 0.0 standard drinks   • Drug use: Never   • Sexual activity: Not on file   Other Topics Concern   • Not on file   Social History Narrative   • Not on file     Social Determinants of Health     Financial Resource Strain:    • Social Determinants: Financial Resource Strain: Not on file   Food Insecurity:    • Social Determinants: Food Insecurity: Not on file   Transportation Needs:    • Lack of Transportation (Medical): Not on file   • Lack of Transportation (Non-Medical): Not on file   Physical Activity:    • Days of Exercise per Week: Not on file   • Minutes of Exercise per Session: Not on file   Stress:    • Social Determinants: Stress: Not on file   Social Connections:    • Social Determinants: Social Connections: Not on file   Intimate Partner Violence: Not At Risk   • Social Determinants: Intimate Partner Violence Past Fear: No   • Social Determinants: Intimate Partner Violence Current Fear: No       ALLERGIES  ALLERGIES:   Allergen Reactions   • Metformin Other (See Comments)     Lactic acidosis       MEDICATIONS  Current Outpatient Medications   Medication Sig   • amLODIPine (NORVASC) 5 MG tablet Take 1 tablet by mouth daily.   • glipiZIDE (GLUCOTROL) 5 MG tablet Take 1 tablet by mouth daily (before breakfast).   • tamsulosin (FLOMAX) 0.4 MG Cap  Take 1 capsule by mouth daily after a meal.   • finasteride (PROSCAR) 5 MG tablet Take 1 tablet by mouth daily.   • blood glucose lancets Test blood sugar two times daily as directed. Diagnosis: type 2 diabetes. Meter: Cheapest   • Blood Glucose Monitoring Suppl (Accu-Chek Guide Me) w/Device Kit Test blood sugar 2 times daily as directed. Diagnosis: Type 2 diabetes. Meter: Cheapest   • blood glucose test strip Test blood sugar 2 times daily as directed. Diagnosis: Type 2 diabetes. Meter: Cheapest   • acetaminophen (TYLENOL) 325 MG tablet Take 650 mg by mouth every 6 hours as needed for Pain or Fever.   • dulaglutide (TRULICITY) 0.75 MG/0.5ML pen-injector Inject 0.75 mg into the skin every 7 days.   • sitaGLIPtin (JANUVIA) 25 MG tablet Take 1 tablet by mouth daily.     No current facility-administered medications for this visit.       REVIEW OF SYSTEMS  As noted in the HPI.    PHYSICIAL EXAM    Vital Signs:   Visit Vitals  BP (!) 179/84   Pulse 100   Temp 97 °F (36.1 °C) (Temporal)   Ht 5' 9\" (1.753 m)   Wt 80.2 kg (176 lb 11.2 oz)   BMI 26.09 kg/m²     General: The patient is well developed, well nourished, in mild distress  HEENT: Normocephalic.  Neck: Symmetric. Trachea midline.  Respiratory: Respiratory effort normal.  Cardiovascular: Regular rate and rhythm.  Back: No costovertebral angle tenderness.  Abdomen:  Not obese. Non-distended.  No hernias are present.  He has diffuse mid and lower abdominal guarding with some tenderness.  He has some right upper quadrant tenderness.  There is no left upper quadrant tenderness.  There is no appreciable mass.  Neurologic: No gross neurologic findings. Moves all extremities. Strength grossly intact. Gait is normal.  Psychiatric:  Depressed mood and affect. Alert and oriented.  Skin: Warm and dry. No rashes where examined  Extremities:  No swelling.    PERTINENT RESULTS    Urinalysis:  Dipstick positive for large leukocytes, nitrite positivity, 1+ protein, trace blood.   Microscopically there are greater than 100 white cells per high-power field and moderate bacteria.    Bladder scan for PVR: <10 cc    PSA, Total (ng/mL)   Date Value   06/05/2018 51.40 (H)   05/09/2016 12.20 (H)   10/23/2015 16.20 (H)     Prostate Specific Antigen (ng/mL)   Date Value   06/08/2021 18.00 (H)       Lab Results   Component Value Date    SODIUM 137 06/08/2021    POTASSIUM 3.9 06/08/2021    CHLORIDE 105 06/08/2021    CO2 25 06/08/2021    CALCIUM 9.0 06/08/2021    GLUCOSE 138 (H) 06/08/2021    CREATININE 1.04 06/08/2021    CREATININE 1.09 04/26/2021    CREATININE 0.93 04/25/2021       Lab Results   Component Value Date    WBC 6.5 04/26/2021    HGB 15.5 04/26/2021    HCT 45.0 04/26/2021     04/26/2021         SAUL CORDOBA MD     Pt reports that she lives in a private house with her  with no steps to enter; and ~10 steps to negotiate to bedroom; (+)1 handrail. Prior to hospital admission, pt was completely independent and used no assistive device; however pt has most recently been using a rolling walker that her daughter got her. Pt denies any recent falls.    Pt left comfortable in bed, NAD, all lines intact, all precautions maintained, and RN aware of PT evaluation.     Pt lef

## 2024-01-10 NOTE — PROGRESS NOTE ADULT - PROBLEM SELECTOR PLAN 1
Likely osteoarthritis, improving   - f/up DONTAE, dsDNA, RF, and CCP ab  - pain control prn   - PT recommends rehab

## 2024-01-10 NOTE — PHYSICAL THERAPY INITIAL EVALUATION ADULT - MANUAL MUSCLE TESTING RESULTS, REHAB EVAL
Informed pt someone will contact him to schedule.   Bilateral upper extremities 4-/5, Bilateral lower extremities 3/5

## 2024-01-10 NOTE — PHYSICAL THERAPY INITIAL EVALUATION ADULT - PERTINENT HX OF CURRENT PROBLEM, REHAB EVAL
84 y/o  F with CAD s/p CABG, HLD, and chronic hep B p/w bilateral knee pain.  Pt reports bilateral hip pain for about 1 month.  She saw her PMD and was referred to an orthopedist.  Per daughter, pt had imaging and testing for autoimmune diseases, but was told the tests were normal.  Over the past few days, pt has had worsening knee pain with swelling of bilateral knees.  She has difficulty standing and walking 2/2 the pain, and has had difficulty getting around her home to perform ADL's. CT Pelvis IMPRESSION: No fracture. Degenerative changes. Small erosion at the left adductor insertion at the left anterior greater trochanter. Possibly the sequela of remote injury or inflammatory arthropathy. X-ray Pelvis (-)fx or dislocation 82 y/o  F with CAD s/p CABG, HLD, and chronic hep B p/w bilateral knee pain.  Pt reports bilateral hip pain for about 1 month.  She saw her PMD and was referred to an orthopedist.  Per daughter, pt had imaging and testing for autoimmune diseases, but was told the tests were normal.  Over the past few days, pt has had worsening knee pain with swelling of bilateral knees.  She has difficulty standing and walking 2/2 the pain, and has had difficulty getting around her home to perform ADL's. CT Pelvis IMPRESSION: No fracture. Degenerative changes. Small erosion at the left adductor insertion at the left anterior greater trochanter. Possibly the sequela of remote injury or inflammatory arthropathy. X-ray Pelvis (-)fx or dislocation

## 2024-01-10 NOTE — PROGRESS NOTE ADULT - ASSESSMENT
84 y/o  F with CAD s/p CABG, HLD, and chronic hep B p/w bilateral knee pain.       82 y/o  F with CAD s/p CABG, HLD, and chronic hep B p/w bilateral knee pain.

## 2024-01-11 DIAGNOSIS — Z29.9 ENCOUNTER FOR PROPHYLACTIC MEASURES, UNSPECIFIED: ICD-10-CM

## 2024-01-11 DIAGNOSIS — M79.661 PAIN IN RIGHT LOWER LEG: ICD-10-CM

## 2024-01-11 LAB
ANION GAP SERPL CALC-SCNC: 11 MMOL/L — SIGNIFICANT CHANGE UP (ref 7–14)
ANION GAP SERPL CALC-SCNC: 11 MMOL/L — SIGNIFICANT CHANGE UP (ref 7–14)
BUN SERPL-MCNC: 24 MG/DL — HIGH (ref 7–23)
BUN SERPL-MCNC: 24 MG/DL — HIGH (ref 7–23)
CALCIUM SERPL-MCNC: 8.5 MG/DL — SIGNIFICANT CHANGE UP (ref 8.4–10.5)
CALCIUM SERPL-MCNC: 8.5 MG/DL — SIGNIFICANT CHANGE UP (ref 8.4–10.5)
CCP IGG SERPL-ACNC: <8 UNITS — SIGNIFICANT CHANGE UP
CCP IGG SERPL-ACNC: <8 UNITS — SIGNIFICANT CHANGE UP
CHLORIDE SERPL-SCNC: 99 MMOL/L — SIGNIFICANT CHANGE UP (ref 98–107)
CHLORIDE SERPL-SCNC: 99 MMOL/L — SIGNIFICANT CHANGE UP (ref 98–107)
CO2 SERPL-SCNC: 22 MMOL/L — SIGNIFICANT CHANGE UP (ref 22–31)
CO2 SERPL-SCNC: 22 MMOL/L — SIGNIFICANT CHANGE UP (ref 22–31)
CREAT SERPL-MCNC: 1.67 MG/DL — HIGH (ref 0.5–1.3)
CREAT SERPL-MCNC: 1.67 MG/DL — HIGH (ref 0.5–1.3)
D DIMER BLD IA.RAPID-MCNC: 1108 NG/ML DDU — HIGH
D DIMER BLD IA.RAPID-MCNC: 1108 NG/ML DDU — HIGH
EGFR: 30 ML/MIN/1.73M2 — LOW
EGFR: 30 ML/MIN/1.73M2 — LOW
GLUCOSE SERPL-MCNC: 165 MG/DL — HIGH (ref 70–99)
GLUCOSE SERPL-MCNC: 165 MG/DL — HIGH (ref 70–99)
HCT VFR BLD CALC: 26.6 % — LOW (ref 34.5–45)
HCT VFR BLD CALC: 26.6 % — LOW (ref 34.5–45)
HGB BLD-MCNC: 8.5 G/DL — LOW (ref 11.5–15.5)
HGB BLD-MCNC: 8.5 G/DL — LOW (ref 11.5–15.5)
MAGNESIUM SERPL-MCNC: 2.1 MG/DL — SIGNIFICANT CHANGE UP (ref 1.6–2.6)
MAGNESIUM SERPL-MCNC: 2.1 MG/DL — SIGNIFICANT CHANGE UP (ref 1.6–2.6)
MCHC RBC-ENTMCNC: 30.9 PG — SIGNIFICANT CHANGE UP (ref 27–34)
MCHC RBC-ENTMCNC: 30.9 PG — SIGNIFICANT CHANGE UP (ref 27–34)
MCHC RBC-ENTMCNC: 32 GM/DL — SIGNIFICANT CHANGE UP (ref 32–36)
MCHC RBC-ENTMCNC: 32 GM/DL — SIGNIFICANT CHANGE UP (ref 32–36)
MCV RBC AUTO: 96.7 FL — SIGNIFICANT CHANGE UP (ref 80–100)
MCV RBC AUTO: 96.7 FL — SIGNIFICANT CHANGE UP (ref 80–100)
NRBC # BLD: 0 /100 WBCS — SIGNIFICANT CHANGE UP (ref 0–0)
NRBC # BLD: 0 /100 WBCS — SIGNIFICANT CHANGE UP (ref 0–0)
NRBC # FLD: 0 K/UL — SIGNIFICANT CHANGE UP (ref 0–0)
NRBC # FLD: 0 K/UL — SIGNIFICANT CHANGE UP (ref 0–0)
PHOSPHATE SERPL-MCNC: 2.3 MG/DL — LOW (ref 2.5–4.5)
PHOSPHATE SERPL-MCNC: 2.3 MG/DL — LOW (ref 2.5–4.5)
PLATELET # BLD AUTO: 218 K/UL — SIGNIFICANT CHANGE UP (ref 150–400)
PLATELET # BLD AUTO: 218 K/UL — SIGNIFICANT CHANGE UP (ref 150–400)
POTASSIUM SERPL-MCNC: 4.5 MMOL/L — SIGNIFICANT CHANGE UP (ref 3.5–5.3)
POTASSIUM SERPL-MCNC: 4.5 MMOL/L — SIGNIFICANT CHANGE UP (ref 3.5–5.3)
POTASSIUM SERPL-SCNC: 4.5 MMOL/L — SIGNIFICANT CHANGE UP (ref 3.5–5.3)
POTASSIUM SERPL-SCNC: 4.5 MMOL/L — SIGNIFICANT CHANGE UP (ref 3.5–5.3)
RBC # BLD: 2.75 M/UL — LOW (ref 3.8–5.2)
RBC # BLD: 2.75 M/UL — LOW (ref 3.8–5.2)
RBC # FLD: 12.9 % — SIGNIFICANT CHANGE UP (ref 10.3–14.5)
RBC # FLD: 12.9 % — SIGNIFICANT CHANGE UP (ref 10.3–14.5)
RF+CCP IGG SER-IMP: NEGATIVE — SIGNIFICANT CHANGE UP
RF+CCP IGG SER-IMP: NEGATIVE — SIGNIFICANT CHANGE UP
SODIUM SERPL-SCNC: 132 MMOL/L — LOW (ref 135–145)
SODIUM SERPL-SCNC: 132 MMOL/L — LOW (ref 135–145)
WBC # BLD: 11.51 K/UL — HIGH (ref 3.8–10.5)
WBC # BLD: 11.51 K/UL — HIGH (ref 3.8–10.5)
WBC # FLD AUTO: 11.51 K/UL — HIGH (ref 3.8–10.5)
WBC # FLD AUTO: 11.51 K/UL — HIGH (ref 3.8–10.5)

## 2024-01-11 PROCEDURE — 99232 SBSQ HOSP IP/OBS MODERATE 35: CPT

## 2024-01-11 RX ORDER — HEPARIN SODIUM 5000 [USP'U]/ML
5000 INJECTION INTRAVENOUS; SUBCUTANEOUS EVERY 12 HOURS
Refills: 0 | Status: DISCONTINUED | OUTPATIENT
Start: 2024-01-11 | End: 2024-01-18

## 2024-01-11 RX ADMIN — ATORVASTATIN CALCIUM 80 MILLIGRAM(S): 80 TABLET, FILM COATED ORAL at 21:23

## 2024-01-11 RX ADMIN — Medication 81 MILLIGRAM(S): at 13:04

## 2024-01-11 RX ADMIN — Medication 600 MILLIGRAM(S): at 19:26

## 2024-01-11 RX ADMIN — HEPARIN SODIUM 5000 UNIT(S): 5000 INJECTION INTRAVENOUS; SUBCUTANEOUS at 19:25

## 2024-01-11 RX ADMIN — FAMOTIDINE 20 MILLIGRAM(S): 10 INJECTION INTRAVENOUS at 13:04

## 2024-01-11 RX ADMIN — POLYETHYLENE GLYCOL 3350 17 GRAM(S): 17 POWDER, FOR SOLUTION ORAL at 13:04

## 2024-01-11 NOTE — PROGRESS NOTE ADULT - PROBLEM SELECTOR PLAN 2
Likely pre-renal   Cr improving w/ IVF   Avoid nephrotoxic agents   Monitor Cr  -Pending todays labs, will consider urine studies and bladder scan

## 2024-01-11 NOTE — PROGRESS NOTE ADULT - PROBLEM SELECTOR PLAN 3
2/2 osteoarthritis, possibly exacerbated by weakness iso of recent COVID. No recent trauma/falls. No s/s of systemic infection.   - dsDNA, RF wnl   - XR b/l knee/pelvis: Left knee effusion. No right knee effusion. No fractures or dislocations. Intra-articular tibiofemoral chondrocalcinosis. Tricompartment osteoarthritic changes. No joint margin erosions. Generalized osteopenia otherwise no discrete lytic or blastic lesions. Posteromedial distal left thigh surgical clips.  -CT Pelvis: No fracture. Degenerative changes. Small erosion at the left adductor insertion at the left anterior greater   trochanter. Possibly the sequela of remote injury or inflammatory arthropathy.  Plan:   - f/u DONTAE and CCP ab  - lidocaine patch, acetaminophen for pain ctrl   - PT recommends rehab  - Outpatient ortho f/u

## 2024-01-11 NOTE — PATIENT PROFILE ADULT - FALL HARM RISK - HARM RISK INTERVENTIONS
Assistance with ambulation/Assistance OOB with selected safe patient handling equipment/Communicate Risk of Fall with Harm to all staff/Reinforce activity limits and safety measures with patient and family/Tailored Fall Risk Interventions/Visual Cue: Yellow wristband and red socks/Bed in lowest position, wheels locked, appropriate side rails in place/Call bell, personal items and telephone in reach/Instruct patient to call for assistance before getting out of bed or chair/Non-slip footwear when patient is out of bed/[place] to call system/Physically safe environment - no spills, clutter or unnecessary equipment/Purposeful Proactive Rounding/Room/bathroom lighting operational, light cord in reach Assistance with ambulation/Assistance OOB with selected safe patient handling equipment/Communicate Risk of Fall with Harm to all staff/Reinforce activity limits and safety measures with patient and family/Tailored Fall Risk Interventions/Visual Cue: Yellow wristband and red socks/Bed in lowest position, wheels locked, appropriate side rails in place/Call bell, personal items and telephone in reach/Instruct patient to call for assistance before getting out of bed or chair/Non-slip footwear when patient is out of bed/[place] to call system/Physically safe environment - no spills, clutter or unnecessary equipment/Purposeful Proactive Rounding/Room/bathroom lighting operational, light cord in reach

## 2024-01-11 NOTE — PROGRESS NOTE ADULT - PROBLEM SELECTOR PROBLEM 2
PROCEDURE/SERVICE DATE:  10/05/2017.      PREOPERATIVE DIAGNOSIS:  First trimester missed .      POSTOPERATIVE DIAGNOSIS:  First trimester missed .      PROCEDURE:  Suction dilation and curettage.      SURGEON:  Mandy Ko MD.      ESTIMATED BLOOD LOSS:  25 cc.      ANESTHESIA:  MAC.      SPECIMENS:  Products of conception for gross and micropathology as well as for chromosome analysis using the Anora test.      OPERATIVE FINDINGS:  The uterus was approximately 8 weeks' size and anteverted.  There was a moderate amount of tissue removed from the suction curettage.  At the end of the procedure, the uterus felt smooth to curettage.      OPERATIVE TECHNIQUE:  After informed consent, Jurgen Resendiz was taken to the operating room, where she was given monitored anesthesia care.  She was placed in the dorsal lithotomy position, and prepped and draped in the usual sterile fashion.  A timeout was performed.  A speculum was inserted in her vagina.  Her cervix was grasped with a single-toothed tenaculum.  Her cervix was dilated to 7 mm using Hegar dilators.  The 7 mm curved suction curet was then used to make several passes of her uterine cavity to remove the pregnancy tissue.  A sharp curettage then revealed the uterus to be empty.  One further pass was made with a suction curet.  The tenaculum was then removed.  There was no significant bleeding noted.  The suction device bag was then opened, and the products of conception were identified, removed and placed in the chromosome detection kit.  The remainder was sent for gross pathology.  The patient tolerated the procedure well.  Counts were correct x2.  She was transferred to the Recovery Room in stable condition.         MANDY KO MD             D: 10/05/2017 10:45   T: 10/05/2017 13:37   MT: [id number]      Name:     JURGEN RESENDIZ   MRN:      [id number]        Account:        [id number]   :      1988           Procedure  Date: 10/05/2017      Document: [id number]     KALYAN (acute kidney injury)

## 2024-01-11 NOTE — PROGRESS NOTE ADULT - SUBJECTIVE AND OBJECTIVE BOX
**********************************************  [facility] Division of Hospital Medicine  Amelia Wing MD  Available via MS Teams  Pager: [phone]  **********************************************     Patient is a 83y old  Female who presents with a chief complaint of Knee pain (10 Kwame 2024 12:30)    SUBJECTIVE / OVERNIGHT EVENTS: No acute events overnight. Patient examined at bedside this AM, complaining of b/l knee pain (R>L) and b/l calf pain (L>R). Also states she had COVID a few weeks ago and has a residual cough productive of yellow sputum. Requesting cough syrup. Denies fevers/chills, CP, palpitations, SOB, n/v/d, abdominal pain.     Robert : Padmini [id number]    OBJECTIVE:  Vital Signs Last 24 Hrs  T(C): 37.2 (11 Jan 2024 05:29), Max: 37.4 (11 Jan 2024 02:35)  T(F): 99 (11 Jan 2024 05:29), Max: 99.3 (11 Jan 2024 02:35)  HR: 99 (11 Jan 2024 05:29) (87 - 99)  BP: 123/58 (11 Jan 2024 05:29) (123/58 - 144/55)  BP(mean): --  RR: 18 (11 Jan 2024 05:29) (17 - 18)  SpO2: 97% (11 Jan 2024 05:29) (97% - 99%)    Parameters below as of 11 Jan 2024 05:29  Patient On (Oxygen Delivery Method): room air        Physical Exam:     General: No acute distress, well-appearing    Eyes: PERRL, EOMI     ENT: MMM, no oropharyngeal lesions or erythema appreciated     Pulm: No increased WOB. CTAB. No wheezing.     CV: RRR. S1&S2+. No M/R/G appreciated.     Abdomen: +BS. Soft, NTND. No organomegaly.     MSK: Nml ROM. Pain with flexion/extension of b/l knees.     Extremities: No peripheral edema or cyanosis. b/l calf ttp. No swelling.     Neuro: A&Ox3, no focal deficits     Skin: Warm and dry. No visible rash.       Labs:  CAPILLARY BLOOD GLUCOSE                              8.6    11.91 )-----------( 203      ( 10 Kwame 2024 06:45 )             25.9     01-10    132<L>  |  100  |  24<H>  ----------------------------<  134<H>  4.6   |  21<L>  |  1.45<H>    Ca    8.5      10 Kwame 2024 06:45  Phos  3.0     01-10  Mg     2.00     01-10    TPro  8.1  /  Alb  3.4  /  TBili  0.4  /  DBili  x   /  AST  44<H>  /  ALT  15  /  AlkPhos  68  01-09            Urinalysis Basic - ( 10 Kwame 2024 06:45 )    Color: x / Appearance: x / SG: x / pH: x  Gluc: 134 mg/dL / Ketone: x  / Bili: x / Urobili: x   Blood: x / Protein: x / Nitrite: x   Leuk Esterase: x / RBC: x / WBC x   Sq Epi: x / Non Sq Epi: x / Bacteria: x      Imaging Personally Reviewed:      MEDICATIONS  (STANDING):  aspirin enteric coated 81 milliGRAM(s) Oral daily  atorvastatin 80 milliGRAM(s) Oral at bedtime  entecavir 0.5 milliGRAM(s) Oral every 72 hours  famotidine    Tablet 20 milliGRAM(s) Oral daily  heparin   Injectable 5000 Unit(s) SubCutaneous every 12 hours  polyethylene glycol 3350 17 Gram(s) Oral daily  sodium chloride 0.9%. 1000 milliLiter(s) (75 mL/Hr) IV Continuous <Continuous>    MEDICATIONS  (PRN):  acetaminophen     Tablet .. 650 milliGRAM(s) Oral every 6 hours PRN Temp greater or equal to 38C (100.4F), Mild Pain (1 - 3)  benzonatate 100 milliGRAM(s) Oral three times a day PRN Cough  guaiFENesin Oral Liquid (Sugar-Free) 100 milliGRAM(s) Oral every 6 hours PRN Cough  melatonin 3 milliGRAM(s) Oral at bedtime PRN Insomnia   **********************************************  [facility] Division of Hospital Medicine  Amelia Wing MD  Available via MS Teams  Pager: [phone]  **********************************************     Patient is a 83y old  Female who presents with a chief complaint of Knee pain (10 Kwame 2024 12:30)    SUBJECTIVE / OVERNIGHT EVENTS: No acute events overnight. Patient examined at bedside this AM, complaining of b/l knee pain (R>L) and b/l calf pain (L>R). Also states she had COVID a few weeks ago and has a residual cough productive of yellow sputum. Requesting cough syrup. Denies fevers/chills, CP, palpitations, SOB, n/v/d, abdominal pain.     Robert : Padmini [id number]    OBJECTIVE:  Vital Signs Last 24 Hrs  T(C): 37.2 (11 Jan 2024 05:29), Max: 37.4 (11 Jan 2024 02:35)  T(F): 99 (11 Jan 2024 05:29), Max: 99.3 (11 Jan 2024 02:35)  HR: 99 (11 Jan 2024 05:29) (87 - 99)  BP: 123/58 (11 Jan 2024 05:29) (123/58 - 144/55)  BP(mean): --  RR: 18 (11 Jan 2024 05:29) (17 - 18)  SpO2: 97% (11 Jan 2024 05:29) (97% - 99%)    Parameters below as of 11 Jan 2024 05:29  Patient On (Oxygen Delivery Method): room air        Physical Exam:     General: No acute distress, well-appearing    Eyes: PERRL, EOMI     ENT: MMM, no oropharyngeal lesions or erythema appreciated     Pulm: No increased WOB. CTAB. No wheezing.     CV: RRR. S1&S2+. No M/R/G appreciated.     Abdomen: +BS. Soft, NTND. No organomegaly.     MSK: Nml ROM. Pain with flexion/extension of b/l knees.     Extremities: No peripheral edema or cyanosis. b/l calf ttp. No swelling.     Neuro: A&Ox3, no focal deficits     Skin: Warm and dry. No visible rash.       Labs:  CAPILLARY BLOOD GLUCOSE                              8.6    11.91 )-----------( 203      ( 10 Kwame 2024 06:45 )             25.9     01-10    132<L>  |  100  |  24<H>  ----------------------------<  134<H>  4.6   |  21<L>  |  1.45<H>    Ca    8.5      10 Kwame 2024 06:45  Phos  3.0     01-10  Mg     2.00     01-10    TPro  8.1  /  Alb  3.4  /  TBili  0.4  /  DBili  x   /  AST  44<H>  /  ALT  15  /  AlkPhos  68  01-09            Urinalysis Basic - ( 10 Kwame 2024 06:45 )    Color: x / Appearance: x / SG: x / pH: x  Gluc: 134 mg/dL / Ketone: x  / Bili: x / Urobili: x   Blood: x / Protein: x / Nitrite: x   Leuk Esterase: x / RBC: x / WBC x   Sq Epi: x / Non Sq Epi: x / Bacteria: x      Imaging Personally Reviewed:      MEDICATIONS  (STANDING):  aspirin enteric coated 81 milliGRAM(s) Oral daily  atorvastatin 80 milliGRAM(s) Oral at bedtime  entecavir 0.5 milliGRAM(s) Oral every 72 hours  famotidine    Tablet 20 milliGRAM(s) Oral daily  heparin   Injectable 5000 Unit(s) SubCutaneous every 12 hours  polyethylene glycol 3350 17 Gram(s) Oral daily  sodium chloride 0.9%. 1000 milliLiter(s) (75 mL/Hr) IV Continuous <Continuous>    MEDICATIONS  (PRN):  acetaminophen     Tablet .. 650 milliGRAM(s) Oral every 6 hours PRN Temp greater or equal to 38C (100.4F), Mild Pain (1 - 3)  benzonatate 100 milliGRAM(s) Oral three times a day PRN Cough  guaiFENesin Oral Liquid (Sugar-Free) 100 milliGRAM(s) Oral every 6 hours PRN Cough  melatonin 3 milliGRAM(s) Oral at bedtime PRN Insomnia

## 2024-01-11 NOTE — PROGRESS NOTE ADULT - ASSESSMENT
Patient present to [facility] for INR check.  INR is 1.7 with INR goal of 2.0-3.0. Warfarin dose was increased  per protocol.  Recheck INR in 4 Days.  Future INR appointment scheduled.      Hospitalizations since last visit?  No.  Diet changes since last visit? No.  Medication changes include: none.      Dose, return date and conditions requiring contact with clinic discussed.  Patient verbalized understanding of instructions and is aware of need to contact clinic with changes in medication, diet and/or health status.       83F [place]onese speaking with PMH of CAD s/p CABG, HLD, and chronic hep B p/w bilateral knee pain.

## 2024-01-11 NOTE — PROGRESS NOTE ADULT - PROBLEM SELECTOR PLAN 1
Pt c/o bilateral calf pain for past few days. No swelling. Recent COVID, c/f associated hypercoagulability.   -Check D dimer   -If elevated, will get VA duplex   -CTM

## 2024-01-12 LAB
ANA PAT FLD IF-IMP: ABNORMAL
ANA PAT FLD IF-IMP: ABNORMAL
ANA TITR SER: ABNORMAL
ANA TITR SER: ABNORMAL
ANION GAP SERPL CALC-SCNC: 12 MMOL/L — SIGNIFICANT CHANGE UP (ref 7–14)
ANION GAP SERPL CALC-SCNC: 12 MMOL/L — SIGNIFICANT CHANGE UP (ref 7–14)
BUN SERPL-MCNC: 27 MG/DL — HIGH (ref 7–23)
BUN SERPL-MCNC: 27 MG/DL — HIGH (ref 7–23)
CALCIUM SERPL-MCNC: 8.8 MG/DL — SIGNIFICANT CHANGE UP (ref 8.4–10.5)
CALCIUM SERPL-MCNC: 8.8 MG/DL — SIGNIFICANT CHANGE UP (ref 8.4–10.5)
CHLORIDE SERPL-SCNC: 99 MMOL/L — SIGNIFICANT CHANGE UP (ref 98–107)
CHLORIDE SERPL-SCNC: 99 MMOL/L — SIGNIFICANT CHANGE UP (ref 98–107)
CO2 SERPL-SCNC: 21 MMOL/L — LOW (ref 22–31)
CO2 SERPL-SCNC: 21 MMOL/L — LOW (ref 22–31)
CREAT SERPL-MCNC: 1.8 MG/DL — HIGH (ref 0.5–1.3)
CREAT SERPL-MCNC: 1.8 MG/DL — HIGH (ref 0.5–1.3)
EGFR: 28 ML/MIN/1.73M2 — LOW
EGFR: 28 ML/MIN/1.73M2 — LOW
GLUCOSE BLDC GLUCOMTR-MCNC: 120 MG/DL — HIGH (ref 70–99)
GLUCOSE BLDC GLUCOMTR-MCNC: 120 MG/DL — HIGH (ref 70–99)
GLUCOSE SERPL-MCNC: 113 MG/DL — HIGH (ref 70–99)
GLUCOSE SERPL-MCNC: 113 MG/DL — HIGH (ref 70–99)
HCT VFR BLD CALC: 25.9 % — LOW (ref 34.5–45)
HCT VFR BLD CALC: 25.9 % — LOW (ref 34.5–45)
HGB BLD-MCNC: 8.4 G/DL — LOW (ref 11.5–15.5)
HGB BLD-MCNC: 8.4 G/DL — LOW (ref 11.5–15.5)
MAGNESIUM SERPL-MCNC: 2.1 MG/DL — SIGNIFICANT CHANGE UP (ref 1.6–2.6)
MAGNESIUM SERPL-MCNC: 2.1 MG/DL — SIGNIFICANT CHANGE UP (ref 1.6–2.6)
MCHC RBC-ENTMCNC: 31.3 PG — SIGNIFICANT CHANGE UP (ref 27–34)
MCHC RBC-ENTMCNC: 31.3 PG — SIGNIFICANT CHANGE UP (ref 27–34)
MCHC RBC-ENTMCNC: 32.4 GM/DL — SIGNIFICANT CHANGE UP (ref 32–36)
MCHC RBC-ENTMCNC: 32.4 GM/DL — SIGNIFICANT CHANGE UP (ref 32–36)
MCV RBC AUTO: 96.6 FL — SIGNIFICANT CHANGE UP (ref 80–100)
MCV RBC AUTO: 96.6 FL — SIGNIFICANT CHANGE UP (ref 80–100)
NRBC # BLD: 0 /100 WBCS — SIGNIFICANT CHANGE UP (ref 0–0)
NRBC # BLD: 0 /100 WBCS — SIGNIFICANT CHANGE UP (ref 0–0)
NRBC # FLD: 0 K/UL — SIGNIFICANT CHANGE UP (ref 0–0)
NRBC # FLD: 0 K/UL — SIGNIFICANT CHANGE UP (ref 0–0)
PHOSPHATE SERPL-MCNC: 2.6 MG/DL — SIGNIFICANT CHANGE UP (ref 2.5–4.5)
PHOSPHATE SERPL-MCNC: 2.6 MG/DL — SIGNIFICANT CHANGE UP (ref 2.5–4.5)
PLATELET # BLD AUTO: 214 K/UL — SIGNIFICANT CHANGE UP (ref 150–400)
PLATELET # BLD AUTO: 214 K/UL — SIGNIFICANT CHANGE UP (ref 150–400)
POTASSIUM SERPL-MCNC: 4.7 MMOL/L — SIGNIFICANT CHANGE UP (ref 3.5–5.3)
POTASSIUM SERPL-MCNC: 4.7 MMOL/L — SIGNIFICANT CHANGE UP (ref 3.5–5.3)
POTASSIUM SERPL-SCNC: 4.7 MMOL/L — SIGNIFICANT CHANGE UP (ref 3.5–5.3)
POTASSIUM SERPL-SCNC: 4.7 MMOL/L — SIGNIFICANT CHANGE UP (ref 3.5–5.3)
RBC # BLD: 2.68 M/UL — LOW (ref 3.8–5.2)
RBC # BLD: 2.68 M/UL — LOW (ref 3.8–5.2)
RBC # FLD: 13 % — SIGNIFICANT CHANGE UP (ref 10.3–14.5)
RBC # FLD: 13 % — SIGNIFICANT CHANGE UP (ref 10.3–14.5)
SODIUM SERPL-SCNC: 132 MMOL/L — LOW (ref 135–145)
SODIUM SERPL-SCNC: 132 MMOL/L — LOW (ref 135–145)
WBC # BLD: 10.54 K/UL — HIGH (ref 3.8–10.5)
WBC # BLD: 10.54 K/UL — HIGH (ref 3.8–10.5)
WBC # FLD AUTO: 10.54 K/UL — HIGH (ref 3.8–10.5)
WBC # FLD AUTO: 10.54 K/UL — HIGH (ref 3.8–10.5)

## 2024-01-12 PROCEDURE — 99233 SBSQ HOSP IP/OBS HIGH 50: CPT

## 2024-01-12 RX ADMIN — Medication 81 MILLIGRAM(S): at 12:45

## 2024-01-12 RX ADMIN — HEPARIN SODIUM 5000 UNIT(S): 5000 INJECTION INTRAVENOUS; SUBCUTANEOUS at 05:51

## 2024-01-12 RX ADMIN — Medication 650 MILLIGRAM(S): at 13:47

## 2024-01-12 RX ADMIN — FAMOTIDINE 20 MILLIGRAM(S): 10 INJECTION INTRAVENOUS at 12:45

## 2024-01-12 RX ADMIN — HEPARIN SODIUM 5000 UNIT(S): 5000 INJECTION INTRAVENOUS; SUBCUTANEOUS at 18:00

## 2024-01-12 RX ADMIN — Medication 650 MILLIGRAM(S): at 12:47

## 2024-01-12 RX ADMIN — ATORVASTATIN CALCIUM 80 MILLIGRAM(S): 80 TABLET, FILM COATED ORAL at 21:48

## 2024-01-12 NOTE — PROGRESS NOTE ADULT - ASSESSMENT
83F [place] speaking with PMH of CAD s/p CABG, HLD, chronic hep B s/p tx, and recent COVID infection now p/w bilateral knee pain, calf pain, and lingering cough. A/w KALYAN and workup of leg pain.

## 2024-01-12 NOTE — PROGRESS NOTE ADULT - SUBJECTIVE AND OBJECTIVE BOX
**********************************************  [facility] Division of Hospital Medicine  Amelia Wing MD  Available via MS Teams  Pager: [phone]  **********************************************     Patient is a 83y old  Female who presents with a chief complaint of Knee pain (11 Jan 2024 13:46)    SUBJECTIVE / OVERNIGHT EVENTS: No acute events overnight. Patient examined at bedside this AM, states she's still having bilateral calf pain and pain in her knees with movement. Also reporting persistent cough productive of yellow sputum. Denies CP, palpitations, SOB, n/v/d, abdominal pain.     [facility] : [id number]    OBJECTIVE:  Vital Signs Last 24 Hrs  T(C): 37 (12 Jan 2024 12:50), Max: 37.6 (12 Jan 2024 05:27)  T(F): 98.6 (12 Jan 2024 12:50), Max: 99.6 (12 Jan 2024 05:27)  HR: 96 (12 Jan 2024 12:50) (92 - 96)  BP: 129/48 (12 Jan 2024 12:50) (115/55 - 133/48)  BP(mean): --  RR: 18 (12 Jan 2024 12:50) (16 - 18)  SpO2: 97% (12 Jan 2024 12:50) (96% - 97%)    Parameters below as of 12 Jan 2024 12:50  Patient On (Oxygen Delivery Method): room air      Physical Exam:     General: No acute distress, well-appearing    Eyes: PERRL, EOMI     ENT: MMM, no oropharyngeal lesions or erythema appreciated     Pulm: No increased WOB. CTAB. No wheezing.     CV: RRR. S1&S2+. No M/R/G appreciated.     Abdomen: +BS. Soft, NTND. No organomegaly.     MSK: Nml ROM    Extremities: No peripheral edema or cyanosis. BLE calf ttp.     Neuro: A&Ox3, no focal deficits     Skin: Warm and dry. No visible rash.     Labs:  CAPILLARY BLOOD GLUCOSE      POCT Blood Glucose.: 120 mg/dL (12 Jan 2024 08:25)                          8.4    10.54 )-----------( 214      ( 12 Jan 2024 06:00 )             25.9     01-12    132<L>  |  99  |  27<H>  ----------------------------<  113<H>  4.7   |  21<L>  |  1.80<H>    Ca    8.8      12 Jan 2024 06:00  Phos  2.6     01-12  Mg     2.10     01-12              Urinalysis Basic - ( 12 Jan 2024 06:00 )    Color: x / Appearance: x / SG: x / pH: x  Gluc: 113 mg/dL / Ketone: x  / Bili: x / Urobili: x   Blood: x / Protein: x / Nitrite: x   Leuk Esterase: x / RBC: x / WBC x   Sq Epi: x / Non Sq Epi: x / Bacteria: x      Imaging Personally Reviewed:      MEDICATIONS  (STANDING):  aspirin enteric coated 81 milliGRAM(s) Oral daily  atorvastatin 80 milliGRAM(s) Oral at bedtime  entecavir 0.5 milliGRAM(s) Oral every 72 hours  famotidine    Tablet 20 milliGRAM(s) Oral daily  heparin   Injectable 5000 Unit(s) SubCutaneous every 12 hours  polyethylene glycol 3350 17 Gram(s) Oral daily  sodium chloride 0.9%. 1000 milliLiter(s) (75 mL/Hr) IV Continuous <Continuous>    MEDICATIONS  (PRN):  acetaminophen     Tablet .. 650 milliGRAM(s) Oral every 6 hours PRN Temp greater or equal to 38C (100.4F), Mild Pain (1 - 3)  benzonatate 100 milliGRAM(s) Oral three times a day PRN Cough  guaiFENesin  milliGRAM(s) Oral every 12 hours PRN Cough  melatonin 3 milliGRAM(s) Oral at bedtime PRN Insomnia   **********************************************  [facility] of Hospital Medicine  Amelia Wing MD  Available via MS Teams  Pager: [phone]  **********************************************     Patient is a 83y old  Female who presents with a chief complaint of Knee pain (11 Jan 2024 13:46)    SUBJECTIVE / OVERNIGHT EVENTS: No acute events overnight. Patient examined at bedside this AM, states she's still having bilateral calf pain and pain in her knees with movement. Also reporting persistent cough productive of yellow sputum. Denies CP, palpitations, SOB, n/v/d, abdominal pain.     [facility] : [id number]    OBJECTIVE:  Vital Signs Last 24 Hrs  T(C): 37 (12 Jan 2024 12:50), Max: 37.6 (12 Jan 2024 05:27)  T(F): 98.6 (12 Jan 2024 12:50), Max: 99.6 (12 Jan 2024 05:27)  HR: 96 (12 Jan 2024 12:50) (92 - 96)  BP: 129/48 (12 Jan 2024 12:50) (115/55 - 133/48)  BP(mean): --  RR: 18 (12 Jan 2024 12:50) (16 - 18)  SpO2: 97% (12 Jan 2024 12:50) (96% - 97%)    Parameters below as of 12 Jan 2024 12:50  Patient On (Oxygen Delivery Method): room air      Physical Exam:     General: No acute distress, well-appearing    Eyes: PERRL, EOMI     ENT: MMM, no oropharyngeal lesions or erythema appreciated     Pulm: No increased WOB. CTAB. No wheezing.     CV: RRR. S1&S2+. No M/R/G appreciated.     Abdomen: +BS. Soft, NTND. No organomegaly.     MSK: Nml ROM    Extremities: No peripheral edema or cyanosis. BLE calf ttp.     Neuro: A&Ox3, no focal deficits     Skin: Warm and dry. No visible rash.     Labs:  CAPILLARY BLOOD GLUCOSE      POCT Blood Glucose.: 120 mg/dL (12 Jan 2024 08:25)                          8.4    10.54 )-----------( 214      ( 12 Jan 2024 06:00 )             25.9     01-12    132<L>  |  99  |  27<H>  ----------------------------<  113<H>  4.7   |  21<L>  |  1.80<H>    Ca    8.8      12 Jan 2024 06:00  Phos  2.6     01-12  Mg     2.10     01-12              Urinalysis Basic - ( 12 Jan 2024 06:00 )    Color: x / Appearance: x / SG: x / pH: x  Gluc: 113 mg/dL / Ketone: x  / Bili: x / Urobili: x   Blood: x / Protein: x / Nitrite: x   Leuk Esterase: x / RBC: x / WBC x   Sq Epi: x / Non Sq Epi: x / Bacteria: x      Imaging Personally Reviewed:      MEDICATIONS  (STANDING):  aspirin enteric coated 81 milliGRAM(s) Oral daily  atorvastatin 80 milliGRAM(s) Oral at bedtime  entecavir 0.5 milliGRAM(s) Oral every 72 hours  famotidine    Tablet 20 milliGRAM(s) Oral daily  heparin   Injectable 5000 Unit(s) SubCutaneous every 12 hours  polyethylene glycol 3350 17 Gram(s) Oral daily  sodium chloride 0.9%. 1000 milliLiter(s) (75 mL/Hr) IV Continuous <Continuous>    MEDICATIONS  (PRN):  acetaminophen     Tablet .. 650 milliGRAM(s) Oral every 6 hours PRN Temp greater or equal to 38C (100.4F), Mild Pain (1 - 3)  benzonatate 100 milliGRAM(s) Oral three times a day PRN Cough  guaiFENesin  milliGRAM(s) Oral every 12 hours PRN Cough  melatonin 3 milliGRAM(s) Oral at bedtime PRN Insomnia

## 2024-01-12 NOTE — PROGRESS NOTE ADULT - PROBLEM SELECTOR PLAN 7
DVT ppx: SQH  Diet: DASH  Dispo: [facility], daughter states she's not sure if she wants patient to go home or to [facility] yet     Daughter updated over phone 1/12

## 2024-01-12 NOTE — PROGRESS NOTE ADULT - PROBLEM SELECTOR PLAN 2
Cr uptrending, 1.8 today. Unclear baseline, daughter states she has some lab work at home and can bring it in tomorrow.   -Follows with [facility] as outpatient   -Urine studies, bladder scan ordered  -If retaining, place redd and order kidney/bladder sono to r/o hydronephrosis  -Strict I/Os  -Avoid nephrotoxic agents  -Renally dose meds

## 2024-01-12 NOTE — PROGRESS NOTE ADULT - PROBLEM SELECTOR PLAN 1
Pt c/o bilateral calf pain for past few days. No swelling. Recent COVID, c/f associated hypercoagulability.   -D dimer 1108  -VA duplex BLE ordered   -CTM

## 2024-01-13 LAB
ANION GAP SERPL CALC-SCNC: 11 MMOL/L — SIGNIFICANT CHANGE UP (ref 7–14)
BUN SERPL-MCNC: 28 MG/DL — HIGH (ref 7–23)
BUN SERPL-MCNC: 28 MG/DL — HIGH (ref 7–23)
BUN SERPL-MCNC: 30 MG/DL — HIGH (ref 7–23)
BUN SERPL-MCNC: 30 MG/DL — HIGH (ref 7–23)
CALCIUM SERPL-MCNC: 9.2 MG/DL — SIGNIFICANT CHANGE UP (ref 8.4–10.5)
CHLORIDE SERPL-SCNC: 94 MMOL/L — LOW (ref 98–107)
CHLORIDE SERPL-SCNC: 94 MMOL/L — LOW (ref 98–107)
CHLORIDE SERPL-SCNC: 96 MMOL/L — LOW (ref 98–107)
CHLORIDE SERPL-SCNC: 96 MMOL/L — LOW (ref 98–107)
CO2 SERPL-SCNC: 25 MMOL/L — SIGNIFICANT CHANGE UP (ref 22–31)
CREAT ?TM UR-MCNC: 56 MG/DL — SIGNIFICANT CHANGE UP
CREAT ?TM UR-MCNC: 56 MG/DL — SIGNIFICANT CHANGE UP
CREAT SERPL-MCNC: 1.78 MG/DL — HIGH (ref 0.5–1.3)
CREAT SERPL-MCNC: 1.78 MG/DL — HIGH (ref 0.5–1.3)
CREAT SERPL-MCNC: 1.85 MG/DL — HIGH (ref 0.5–1.3)
CREAT SERPL-MCNC: 1.85 MG/DL — HIGH (ref 0.5–1.3)
EGFR: 27 ML/MIN/1.73M2 — LOW
EGFR: 27 ML/MIN/1.73M2 — LOW
EGFR: 28 ML/MIN/1.73M2 — LOW
EGFR: 28 ML/MIN/1.73M2 — LOW
GLUCOSE SERPL-MCNC: 133 MG/DL — HIGH (ref 70–99)
GLUCOSE SERPL-MCNC: 133 MG/DL — HIGH (ref 70–99)
GLUCOSE SERPL-MCNC: 159 MG/DL — HIGH (ref 70–99)
GLUCOSE SERPL-MCNC: 159 MG/DL — HIGH (ref 70–99)
HCT VFR BLD CALC: 28.6 % — LOW (ref 34.5–45)
HCT VFR BLD CALC: 28.6 % — LOW (ref 34.5–45)
HGB BLD-MCNC: 9.5 G/DL — LOW (ref 11.5–15.5)
HGB BLD-MCNC: 9.5 G/DL — LOW (ref 11.5–15.5)
MAGNESIUM SERPL-MCNC: 2.1 MG/DL — SIGNIFICANT CHANGE UP (ref 1.6–2.6)
MCHC RBC-ENTMCNC: 31.4 PG — SIGNIFICANT CHANGE UP (ref 27–34)
MCHC RBC-ENTMCNC: 31.4 PG — SIGNIFICANT CHANGE UP (ref 27–34)
MCHC RBC-ENTMCNC: 33.2 GM/DL — SIGNIFICANT CHANGE UP (ref 32–36)
MCHC RBC-ENTMCNC: 33.2 GM/DL — SIGNIFICANT CHANGE UP (ref 32–36)
MCV RBC AUTO: 94.4 FL — SIGNIFICANT CHANGE UP (ref 80–100)
MCV RBC AUTO: 94.4 FL — SIGNIFICANT CHANGE UP (ref 80–100)
NRBC # BLD: 0 /100 WBCS — SIGNIFICANT CHANGE UP (ref 0–0)
NRBC # BLD: 0 /100 WBCS — SIGNIFICANT CHANGE UP (ref 0–0)
NRBC # FLD: 0 K/UL — SIGNIFICANT CHANGE UP (ref 0–0)
NRBC # FLD: 0 K/UL — SIGNIFICANT CHANGE UP (ref 0–0)
PHOSPHATE SERPL-MCNC: 3.5 MG/DL — SIGNIFICANT CHANGE UP (ref 2.5–4.5)
PHOSPHATE SERPL-MCNC: 3.5 MG/DL — SIGNIFICANT CHANGE UP (ref 2.5–4.5)
PHOSPHATE SERPL-MCNC: 3.7 MG/DL — SIGNIFICANT CHANGE UP (ref 2.5–4.5)
PHOSPHATE SERPL-MCNC: 3.7 MG/DL — SIGNIFICANT CHANGE UP (ref 2.5–4.5)
PLATELET # BLD AUTO: 260 K/UL — SIGNIFICANT CHANGE UP (ref 150–400)
PLATELET # BLD AUTO: 260 K/UL — SIGNIFICANT CHANGE UP (ref 150–400)
POTASSIUM SERPL-MCNC: 4.6 MMOL/L — SIGNIFICANT CHANGE UP (ref 3.5–5.3)
POTASSIUM SERPL-MCNC: 4.6 MMOL/L — SIGNIFICANT CHANGE UP (ref 3.5–5.3)
POTASSIUM SERPL-MCNC: 4.9 MMOL/L — SIGNIFICANT CHANGE UP (ref 3.5–5.3)
POTASSIUM SERPL-MCNC: 4.9 MMOL/L — SIGNIFICANT CHANGE UP (ref 3.5–5.3)
POTASSIUM SERPL-SCNC: 4.6 MMOL/L — SIGNIFICANT CHANGE UP (ref 3.5–5.3)
POTASSIUM SERPL-SCNC: 4.6 MMOL/L — SIGNIFICANT CHANGE UP (ref 3.5–5.3)
POTASSIUM SERPL-SCNC: 4.9 MMOL/L — SIGNIFICANT CHANGE UP (ref 3.5–5.3)
POTASSIUM SERPL-SCNC: 4.9 MMOL/L — SIGNIFICANT CHANGE UP (ref 3.5–5.3)
PROT ?TM UR-MCNC: 32 MG/DL — SIGNIFICANT CHANGE UP
PROT ?TM UR-MCNC: 32 MG/DL — SIGNIFICANT CHANGE UP
PROT/CREAT UR-RTO: 0.6 RATIO — HIGH (ref 0–0.2)
PROT/CREAT UR-RTO: 0.6 RATIO — HIGH (ref 0–0.2)
RBC # BLD: 3.03 M/UL — LOW (ref 3.8–5.2)
RBC # BLD: 3.03 M/UL — LOW (ref 3.8–5.2)
RBC # FLD: 12.6 % — SIGNIFICANT CHANGE UP (ref 10.3–14.5)
RBC # FLD: 12.6 % — SIGNIFICANT CHANGE UP (ref 10.3–14.5)
SODIUM SERPL-SCNC: 130 MMOL/L — LOW (ref 135–145)
SODIUM SERPL-SCNC: 130 MMOL/L — LOW (ref 135–145)
SODIUM SERPL-SCNC: 132 MMOL/L — LOW (ref 135–145)
SODIUM SERPL-SCNC: 132 MMOL/L — LOW (ref 135–145)
SODIUM UR-SCNC: 46 MMOL/L — SIGNIFICANT CHANGE UP
SODIUM UR-SCNC: 46 MMOL/L — SIGNIFICANT CHANGE UP
WBC # BLD: 10 K/UL — SIGNIFICANT CHANGE UP (ref 3.8–10.5)
WBC # BLD: 10 K/UL — SIGNIFICANT CHANGE UP (ref 3.8–10.5)
WBC # FLD AUTO: 10 K/UL — SIGNIFICANT CHANGE UP (ref 3.8–10.5)
WBC # FLD AUTO: 10 K/UL — SIGNIFICANT CHANGE UP (ref 3.8–10.5)

## 2024-01-13 PROCEDURE — 99232 SBSQ HOSP IP/OBS MODERATE 35: CPT

## 2024-01-13 RX ORDER — SODIUM CHLORIDE 9 MG/ML
1000 INJECTION INTRAMUSCULAR; INTRAVENOUS; SUBCUTANEOUS
Refills: 0 | Status: COMPLETED | OUTPATIENT
Start: 2024-01-13 | End: 2024-01-14

## 2024-01-13 RX ORDER — SODIUM CHLORIDE 9 MG/ML
1000 INJECTION INTRAMUSCULAR; INTRAVENOUS; SUBCUTANEOUS
Refills: 0 | Status: DISCONTINUED | OUTPATIENT
Start: 2024-01-13 | End: 2024-01-13

## 2024-01-13 RX ORDER — ACETAMINOPHEN 500 MG
600 TABLET ORAL ONCE
Refills: 0 | Status: COMPLETED | OUTPATIENT
Start: 2024-01-13 | End: 2024-01-13

## 2024-01-13 RX ADMIN — Medication 650 MILLIGRAM(S): at 03:17

## 2024-01-13 RX ADMIN — Medication 240 MILLIGRAM(S): at 18:14

## 2024-01-13 RX ADMIN — ENTECAVIR 0.5 MILLIGRAM(S): 0.5 TABLET ORAL at 06:19

## 2024-01-13 RX ADMIN — FAMOTIDINE 20 MILLIGRAM(S): 10 INJECTION INTRAVENOUS at 14:32

## 2024-01-13 RX ADMIN — SODIUM CHLORIDE 50 MILLILITER(S): 9 INJECTION INTRAMUSCULAR; INTRAVENOUS; SUBCUTANEOUS at 21:54

## 2024-01-13 RX ADMIN — Medication 650 MILLIGRAM(S): at 02:47

## 2024-01-13 RX ADMIN — Medication 81 MILLIGRAM(S): at 14:32

## 2024-01-13 RX ADMIN — HEPARIN SODIUM 5000 UNIT(S): 5000 INJECTION INTRAVENOUS; SUBCUTANEOUS at 06:19

## 2024-01-13 RX ADMIN — SODIUM CHLORIDE 50 MILLILITER(S): 9 INJECTION INTRAMUSCULAR; INTRAVENOUS; SUBCUTANEOUS at 14:33

## 2024-01-13 RX ADMIN — ATORVASTATIN CALCIUM 80 MILLIGRAM(S): 80 TABLET, FILM COATED ORAL at 21:54

## 2024-01-13 RX ADMIN — HEPARIN SODIUM 5000 UNIT(S): 5000 INJECTION INTRAVENOUS; SUBCUTANEOUS at 18:14

## 2024-01-13 NOTE — DISCHARGE NOTE PROVIDER - HOSPITAL COURSE
83F [place] speaking with PMH of CAD s/p CABG, HLD, chronic hep B s/p tx, and recent COVID infection now p/w bilateral knee pain, calf pain, and lingering cough. A/w KALYAN and workup of leg pain.     #Bilateral knee pain: Pt with acute on chronic bilateral knee pain. Likely advancement of underlying osteoarthritis. XR b/l knee shows "left knee effusion. Intra-articular tibiofemoral chondrocalcinosis. Tricompartment osteoarthritic changes." Patient's pain controlled with tylenol and lidocaine patches. PT recommended [facility]. Pt recommended to follow up with outpatient ortho. Of note, patient's rheumatologic workup revealed elevated DONTAE but RF and dsDNA were wnl. Will need outpatient Rheumatologic follow up.     #KALYAN: Patient p/w acute on presumed chronic CKD. Cr. 1.52 on presentation and uptrended to 1.8, unclear baseline. Bladder scan with no evidence of urinary retention. Workup showed _____. Patient was treated with ____.     #Bilateral calf pain: Pt was complaining of bilateral calf pain, no swelling. Found to have elevated D-dimer to 1K, likely in the setting of recent COVID infection. VA duplex showed _____.      83-year-old female history of CABG, presenting to the emergency department with concern for generalized weakness and hip/knee pain    Bilateral knee pain.   - 2/2 osteoarthritis, possibly exacerbated by weakness iso of recent COVID. No recent trauma/falls. No s/s of systemic infection.   - dsDNA, RF wnl   - XR b/l knee/pelvis: Left knee effusion. No right knee effusion. No fractures or dislocations. Intra-articular tibiofemoral chondrocalcinosis. Tricompartment osteoarthritic changes. No joint margin erosions. Generalized osteopenia otherwise no discrete lytic or blastic lesions. Posteromedial distal left thigh surgical clips.  -CT Pelvis: No fracture. Degenerative changes. Small erosion at the left adductor insertion at the left anterior greater   trochanter. Possibly the sequela of remote   - DONTAE positive, CCP AB neg  - lidocaine patch, acetaminophen for pain ctrl. Would likely derive benefit from Voltaren gel, but not on hospital formulary. No po NSAIDs due to CKD  - MR knees b/l ordered  - Outpatient ortho f/u. Daughter states patient would not agree to knee replacement if offered.    Bilateral calf pain.   - Pt c/o bilateral calf pain for past few days. No swelling. Recent COVID, c/f associated hypercoagulability.   -D dimer 1108  -VA duplex BLE: no evidence of deep vein thrombosis noted in the bilateral lower extremities.    Stage 3 chronic kidney disease.   - Cr stable, unclear baseline,   - Follows with [facility] as outpatient   -Strict I/Os  -Avoid nephrotoxic agents  -Renally dose meds.    Hyponatremia.   - Likely hypovolemic hyponatremia  - Improving w/ IV fluids.   - mIVF NS at 50 cc/hr  - Monitor Na.    CAD (coronary artery disease).   - continue ASA 81 and atorvastatin 80.    Hepatitis B.   - continue entecavir.    On 01/18/24, case was discussed with Dr. Perales, patient is medically cleared and optimized for discharge today. All medications were reviewed with attending.   83-year-old female history of CABG, presenting to the emergency department with concern for generalized weakness and hip/knee pain    Bilateral knee pain.   - 2/2 osteoarthritis, possibly exacerbated by weakness iso of recent COVID. No recent trauma/falls. No s/s of systemic infection.   - dsDNA, RF wnl   - XR b/l knee/pelvis: Left knee effusion. No right knee effusion. No fractures or dislocations. Intra-articular tibiofemoral chondrocalcinosis. Tricompartment osteoarthritic changes. No joint margin erosions. Generalized osteopenia otherwise no discrete lytic or blastic lesions. Posteromedial distal left thigh surgical clips.  -CT Pelvis: No fracture. Degenerative changes. Small erosion at the left adductor insertion at the left anterior greater   trochanter. Possibly the sequela of remote   - DONTAE positive, CCP AB neg  - lidocaine patch, acetaminophen for pain ctrl. Would likely derive benefit from Voltaren gel, but not on hospital formulary. No po NSAIDs due to CKD  - MR knees b/l ordered, MRI can be done as outpatient as pain improved  , pt and daughter in agreement    - Outpatient ortho f/u. Daughter states patient would not agree to knee replacement if offered.    Bilateral calf pain.   - Pt c/o bilateral calf pain for past few days. No swelling. Recent COVID, c/f associated hypercoagulability.   -D dimer 1108  -VA duplex BLE: no evidence of deep vein thrombosis noted in the bilateral lower extremities.    Stage 3 chronic kidney disease.   - Cr stable, unclear baseline,   - Follows with [facility] as outpatient   -Strict I/Os  -Avoid nephrotoxic agents  -Renally dose meds.    Hyponatremia.   - Likely hypovolemic hyponatremia  - Improving w/ IV fluids.   - mIVF NS at 50 cc/hr  - Monitor Na.    CAD (coronary artery disease).   - continue ASA 81 and atorvastatin 80.    Hepatitis B.   - continue entecavir.    On 01/18/24, case was discussed with Dr. Perales, patient is medically cleared and optimized for discharge today. All medications were reviewed with attending.  Patient hemodynamically stable for discharge home  Time spent in discharge process is 34 min

## 2024-01-13 NOTE — PROGRESS NOTE ADULT - PROBLEM SELECTOR PLAN 4
Likely hypovolemic hyponatremia  Improving w/ IV fluids   Monitor Na Likely hypovolemic hyponatremia  Improving w/ IV fluids.   mIVF NS at 50 cc/hr  Monitor Na

## 2024-01-13 NOTE — DISCHARGE NOTE PROVIDER - NSDCCPCAREPLAN_GEN_ALL_CORE_FT
PRINCIPAL DISCHARGE DIAGNOSIS  Diagnosis: Bilateral knee pain  Assessment and Plan of Treatment:       SECONDARY DISCHARGE DIAGNOSES  Diagnosis: CAD (coronary artery disease)  Assessment and Plan of Treatment:     Diagnosis: Renal insufficiency  Assessment and Plan of Treatment:      PRINCIPAL DISCHARGE DIAGNOSIS  Diagnosis: Bilateral knee pain  Assessment and Plan of Treatment:       SECONDARY DISCHARGE DIAGNOSES  Diagnosis: CAD (coronary artery disease)  Assessment and Plan of Treatment: Continue aspirin, do not stop unless instructed by your physician.  Continue low salt, fat, cholesterol and carbohydrate diet. Follow up with cardiologist and primary care physician's routine appointment.    Diagnosis: Hyponatremia  Assessment and Plan of Treatment: Your sodium level have been low. Improvement was seen with IV fluids. Please continue to hydrate. Follow up with your primary care physician to trend your sodium levels.     PRINCIPAL DISCHARGE DIAGNOSIS  Diagnosis: Bilateral knee pain  Assessment and Plan of Treatment: You experienced calf/knee pain in both lower extremities. We made sure you do not have a clot in your vessels. Follow up with your primary care physician to obtain MRI bilateral knees. Please refrain from taking NSAIDs as it will take a toll on your kidneys.      SECONDARY DISCHARGE DIAGNOSES  Diagnosis: CAD (coronary artery disease)  Assessment and Plan of Treatment: Continue aspirin, do not stop unless instructed by your physician.  Continue low salt, fat, cholesterol and carbohydrate diet. Follow up with cardiologist and primary care physician's routine appointment.    Diagnosis: Hyponatremia  Assessment and Plan of Treatment: Your sodium level have been low. Improvement was seen with IV fluids. Please continue to hydrate. Follow up with your primary care physician to trend your sodium levels.    Diagnosis: Hepatitis B  Assessment and Plan of Treatment: Please continue the current medication regimen. Follow up with your primary care physician and/or hepatologist.    Diagnosis: Stage 3 chronic kidney disease  Assessment and Plan of Treatment: Goal of hemoglobin A1C (HgbA1C) < 7%.  Avoid nephrotoxic drugs such as nonsteroidal anti-inflammatory agents (NSAIDs).   Please follow up with your nephrologist to monitor your kidney function, continue with low protein and potassium diet.

## 2024-01-13 NOTE — DISCHARGE NOTE PROVIDER - DETAILS OF MALNUTRITION DIAGNOSIS/DIAGNOSES
This patient has been assessed with a concern for Malnutrition and was treated during this hospitalization for the following Nutrition diagnosis/diagnoses:     -  01/16/2024: Underweight (BMI < 19)

## 2024-01-13 NOTE — PROGRESS NOTE ADULT - SUBJECTIVE AND OBJECTIVE BOX
Patient is a 83y old  Female who presents with a chief complaint of Knee pain (13 Jan 2024 10:06)      INTERVAL HPI/OVERNIGHT EVENTS: LUIZA overnight. This afternoon, c/o pain in R knee.       REVIEW OF SYSTEMS:    CONSTITUTIONAL: No weakness, fevers or chills  EYES/ENT: No visual changes;  No vertigo or throat pain   NECK: No pain or stiffness  RESPIRATORY: No cough, wheezing, hemoptysis; No shortness of breath  CARDIOVASCULAR: No chest pain or palpitations  GASTROINTESTINAL: No abdominal or epigastric pain. No nausea, vomiting, or hematemesis; No diarrhea or constipation. No melena or hematochezia.  GENITOURINARY: No dysuria, frequency or hematuria  NEUROLOGICAL: No numbness or weakness  All other review of systems is negative unless indicated above.    FAMILY HISTORY:  No pertinent family history in first degree relatives      T(C): 36.7 (01-13-24 @ 13:39), Max: 36.8 (01-12-24 @ 21:47)  HR: 85 (01-13-24 @ 13:39) (81 - 90)  BP: 123/43 (01-13-24 @ 13:39) (118/48 - 134/47)  RR: 18 (01-13-24 @ 13:39) (16 - 18)  SpO2: 99% (01-13-24 @ 13:39) (99% - 100%)  Wt(kg): --Vital Signs Last 24 Hrs  T(C): 36.7 (13 Jan 2024 13:39), Max: 36.8 (12 Jan 2024 21:47)  T(F): 98.1 (13 Jan 2024 13:39), Max: 98.2 (12 Jan 2024 21:47)  HR: 85 (13 Jan 2024 13:39) (81 - 90)  BP: 123/43 (13 Jan 2024 13:39) (118/48 - 134/47)  BP(mean): --  RR: 18 (13 Jan 2024 13:39) (16 - 18)  SpO2: 99% (13 Jan 2024 13:39) (99% - 100%)    Parameters below as of 13 Jan 2024 13:39  Patient On (Oxygen Delivery Method): room air        PHYSICAL EXAM:    General: No acute distress, well-appearing    Eyes: PERRL, EOMI     ENT: MMM, no oropharyngeal lesions or erythema appreciated     Pulm: No increased WOB. CTAB. No wheezing.     CV: RRR. S1&S2+. No M/R/G appreciated.     Abdomen: +BS. Soft, NTND. No organomegaly.     MSK: Nml ROM    Extremities: No peripheral edema or cyanosis.    Neuro: A&Ox3, no focal deficits     Skin: Warm and dry. No visible rash.     Consultant(s) Notes Reviewed:  [x ] YES  [ ] NO  Care Discussed with Consultants/Other Providers [ x] YES  [ ] NO    LABS:                        9.5    10.00 )-----------( 260      ( 13 Jan 2024 07:19 )             28.6     13 Jan 2024 07:19    130    |  94     |  28     ----------------------------<  133    4.6     |  25     |  1.85     Ca    9.2        13 Jan 2024 07:19  Phos  3.5       13 Jan 2024 07:19  Mg     2.10      13 Jan 2024 07:19        CAPILLARY BLOOD GLUCOSE        BLOOD CULTURE      RADIOLOGY & ADDITIONAL TESTS:    Imaging Personally Reviewed:  [ ] YES  [ ] NO  acetaminophen     Tablet .. 650 milliGRAM(s) Oral every 6 hours PRN  aspirin enteric coated 81 milliGRAM(s) Oral daily  atorvastatin 80 milliGRAM(s) Oral at bedtime  benzonatate 100 milliGRAM(s) Oral three times a day PRN  entecavir 0.5 milliGRAM(s) Oral every 72 hours  famotidine    Tablet 20 milliGRAM(s) Oral daily  guaiFENesin  milliGRAM(s) Oral every 12 hours PRN  heparin   Injectable 5000 Unit(s) SubCutaneous every 12 hours  melatonin 3 milliGRAM(s) Oral at bedtime PRN  polyethylene glycol 3350 17 Gram(s) Oral daily      HEALTH ISSUES - PROBLEM Dx:  Bilateral knee pain    CAD (coronary artery disease)    Hepatitis B    Renal insufficiency    Hyponatremia    KALYAN (acute kidney injury)    Bilateral calf pain    Need for prophylactic measure

## 2024-01-13 NOTE — PROGRESS NOTE ADULT - PROBLEM SELECTOR PLAN 3
2/2 osteoarthritis, possibly exacerbated by weakness iso of recent COVID. No recent trauma/falls. No s/s of systemic infection.   - dsDNA, RF wnl   - XR b/l knee/pelvis: Left knee effusion. No right knee effusion. No fractures or dislocations. Intra-articular tibiofemoral chondrocalcinosis. Tricompartment osteoarthritic changes. No joint margin erosions. Generalized osteopenia otherwise no discrete lytic or blastic lesions. Posteromedial distal left thigh surgical clips.  -CT Pelvis: No fracture. Degenerative changes. Small erosion at the left adductor insertion at the left anterior greater   trochanter. Possibly the sequela of remote injury or inflammatory arthropathy.  Plan:   - DONTAE positive, CCP AB neg  - lidocaine patch, acetaminophen for pain ctrl   - PT recommends rehab  - Outpatient ortho f/u

## 2024-01-13 NOTE — PROGRESS NOTE ADULT - PROBLEM SELECTOR PLAN 2
Cr uptrending, Unclear baseline, daughter states she has some lab work at home and can bring it in tomorrow.   -Follows with [facility] as outpatient   -Urine studies performed, bladder scan ordered  -If retaining, place redd and order kidney/bladder sono to r/o hydronephrosis  -Strict I/Os  -Avoid nephrotoxic agents  -Renally dose meds  -mIVF Cr uptrending, Unclear baseline, daughter states she has some lab work at home and can bring it in tomorrow.   -Follows with Leela Em as outpatient   -Urine studies performed, bladder scan ordered  -If retaining, place redd and order kidney/bladder sono to r/o hydronephrosis  -Strict I/Os  -Avoid nephrotoxic agents  -Renally dose meds  -mIVF NS at 50 cc/hr

## 2024-01-13 NOTE — DISCHARGE NOTE PROVIDER - NSDCMRMEDTOKEN_GEN_ALL_CORE_FT
aspirin 81 mg oral tablet: 1 tab(s) orally once a day  Crestor 20 mg oral tablet: 1 tab(s) orally once a day  entecavir 0.5 mg oral tablet: 1 tab(s) orally once a day  famotidine 20 mg oral tablet: 1 tab(s) orally once a day  Vitamin D2 50,000 intl units (1.25 mg) oral capsule (obsolete): 1 cap(s) orally once a week   aspirin 81 mg oral delayed release tablet: 1 tab(s) orally once a day  atorvastatin 80 mg oral tablet: 1 tab(s) orally once a day (at bedtime)  entecavir 0.5 mg oral tablet: 1 tab(s) orally every 72 hours  famotidine 20 mg oral tablet: 1 tab(s) orally once a day  heparin: 5,000 unit(s) subcutaneous every 12 hours  lidocaine 4% topical film: Apply topically to affected area every 24 hours knee pain  melatonin 3 mg oral tablet: 1 tab(s) orally once a day (at bedtime) As needed Insomnia  polyethylene glycol 3350 oral powder for reconstitution: 17 gram(s) orally once a day  Vitamin D2 50,000 intl units (1.25 mg) oral capsule (obsolete): 1 cap(s) orally once a week

## 2024-01-13 NOTE — PROGRESS NOTE ADULT - PROBLEM SELECTOR PLAN 7
DVT ppx: SQH  Diet: DASH  Dispo: [facility], daughter states she's not sure if she wants patient to go home or to [facility] yet

## 2024-01-14 DIAGNOSIS — N18.30 CHRONIC KIDNEY DISEASE, STAGE 3 UNSPECIFIED: ICD-10-CM

## 2024-01-14 LAB
ANION GAP SERPL CALC-SCNC: 14 MMOL/L — SIGNIFICANT CHANGE UP (ref 7–14)
ANION GAP SERPL CALC-SCNC: 14 MMOL/L — SIGNIFICANT CHANGE UP (ref 7–14)
BUN SERPL-MCNC: 28 MG/DL — HIGH (ref 7–23)
BUN SERPL-MCNC: 28 MG/DL — HIGH (ref 7–23)
CALCIUM SERPL-MCNC: 9.3 MG/DL — SIGNIFICANT CHANGE UP (ref 8.4–10.5)
CALCIUM SERPL-MCNC: 9.3 MG/DL — SIGNIFICANT CHANGE UP (ref 8.4–10.5)
CHLORIDE SERPL-SCNC: 97 MMOL/L — LOW (ref 98–107)
CHLORIDE SERPL-SCNC: 97 MMOL/L — LOW (ref 98–107)
CO2 SERPL-SCNC: 22 MMOL/L — SIGNIFICANT CHANGE UP (ref 22–31)
CO2 SERPL-SCNC: 22 MMOL/L — SIGNIFICANT CHANGE UP (ref 22–31)
CREAT SERPL-MCNC: 1.67 MG/DL — HIGH (ref 0.5–1.3)
CREAT SERPL-MCNC: 1.67 MG/DL — HIGH (ref 0.5–1.3)
EGFR: 30 ML/MIN/1.73M2 — LOW
EGFR: 30 ML/MIN/1.73M2 — LOW
GLUCOSE SERPL-MCNC: 121 MG/DL — HIGH (ref 70–99)
GLUCOSE SERPL-MCNC: 121 MG/DL — HIGH (ref 70–99)
HCT VFR BLD CALC: 28.1 % — LOW (ref 34.5–45)
HCT VFR BLD CALC: 28.1 % — LOW (ref 34.5–45)
HGB BLD-MCNC: 9.1 G/DL — LOW (ref 11.5–15.5)
HGB BLD-MCNC: 9.1 G/DL — LOW (ref 11.5–15.5)
MAGNESIUM SERPL-MCNC: 2.1 MG/DL — SIGNIFICANT CHANGE UP (ref 1.6–2.6)
MAGNESIUM SERPL-MCNC: 2.1 MG/DL — SIGNIFICANT CHANGE UP (ref 1.6–2.6)
MCHC RBC-ENTMCNC: 31.1 PG — SIGNIFICANT CHANGE UP (ref 27–34)
MCHC RBC-ENTMCNC: 31.1 PG — SIGNIFICANT CHANGE UP (ref 27–34)
MCHC RBC-ENTMCNC: 32.4 GM/DL — SIGNIFICANT CHANGE UP (ref 32–36)
MCHC RBC-ENTMCNC: 32.4 GM/DL — SIGNIFICANT CHANGE UP (ref 32–36)
MCV RBC AUTO: 95.9 FL — SIGNIFICANT CHANGE UP (ref 80–100)
MCV RBC AUTO: 95.9 FL — SIGNIFICANT CHANGE UP (ref 80–100)
NRBC # BLD: 0 /100 WBCS — SIGNIFICANT CHANGE UP (ref 0–0)
NRBC # BLD: 0 /100 WBCS — SIGNIFICANT CHANGE UP (ref 0–0)
NRBC # FLD: 0 K/UL — SIGNIFICANT CHANGE UP (ref 0–0)
NRBC # FLD: 0 K/UL — SIGNIFICANT CHANGE UP (ref 0–0)
OSMOLALITY SERPL: 292 MOSM/KG — SIGNIFICANT CHANGE UP (ref 275–295)
OSMOLALITY SERPL: 292 MOSM/KG — SIGNIFICANT CHANGE UP (ref 275–295)
PHOSPHATE SERPL-MCNC: 3.6 MG/DL — SIGNIFICANT CHANGE UP (ref 2.5–4.5)
PHOSPHATE SERPL-MCNC: 3.6 MG/DL — SIGNIFICANT CHANGE UP (ref 2.5–4.5)
PLATELET # BLD AUTO: 245 K/UL — SIGNIFICANT CHANGE UP (ref 150–400)
PLATELET # BLD AUTO: 245 K/UL — SIGNIFICANT CHANGE UP (ref 150–400)
POTASSIUM SERPL-MCNC: 4.7 MMOL/L — SIGNIFICANT CHANGE UP (ref 3.5–5.3)
POTASSIUM SERPL-MCNC: 4.7 MMOL/L — SIGNIFICANT CHANGE UP (ref 3.5–5.3)
POTASSIUM SERPL-SCNC: 4.7 MMOL/L — SIGNIFICANT CHANGE UP (ref 3.5–5.3)
POTASSIUM SERPL-SCNC: 4.7 MMOL/L — SIGNIFICANT CHANGE UP (ref 3.5–5.3)
RBC # BLD: 2.93 M/UL — LOW (ref 3.8–5.2)
RBC # BLD: 2.93 M/UL — LOW (ref 3.8–5.2)
RBC # FLD: 12.7 % — SIGNIFICANT CHANGE UP (ref 10.3–14.5)
RBC # FLD: 12.7 % — SIGNIFICANT CHANGE UP (ref 10.3–14.5)
SODIUM SERPL-SCNC: 133 MMOL/L — LOW (ref 135–145)
SODIUM SERPL-SCNC: 133 MMOL/L — LOW (ref 135–145)
WBC # BLD: 7.92 K/UL — SIGNIFICANT CHANGE UP (ref 3.8–10.5)
WBC # BLD: 7.92 K/UL — SIGNIFICANT CHANGE UP (ref 3.8–10.5)
WBC # FLD AUTO: 7.92 K/UL — SIGNIFICANT CHANGE UP (ref 3.8–10.5)
WBC # FLD AUTO: 7.92 K/UL — SIGNIFICANT CHANGE UP (ref 3.8–10.5)

## 2024-01-14 PROCEDURE — 93970 EXTREMITY STUDY: CPT | Mod: 26

## 2024-01-14 PROCEDURE — 99232 SBSQ HOSP IP/OBS MODERATE 35: CPT

## 2024-01-14 RX ORDER — LIDOCAINE 4 G/100G
1 CREAM TOPICAL EVERY 24 HOURS
Refills: 0 | Status: DISCONTINUED | OUTPATIENT
Start: 2024-01-14 | End: 2024-01-18

## 2024-01-14 RX ORDER — SODIUM CHLORIDE 9 MG/ML
1000 INJECTION INTRAMUSCULAR; INTRAVENOUS; SUBCUTANEOUS
Refills: 0 | Status: DISCONTINUED | OUTPATIENT
Start: 2024-01-14 | End: 2024-01-15

## 2024-01-14 RX ADMIN — HEPARIN SODIUM 5000 UNIT(S): 5000 INJECTION INTRAVENOUS; SUBCUTANEOUS at 18:41

## 2024-01-14 RX ADMIN — SODIUM CHLORIDE 50 MILLILITER(S): 9 INJECTION INTRAMUSCULAR; INTRAVENOUS; SUBCUTANEOUS at 13:25

## 2024-01-14 RX ADMIN — FAMOTIDINE 20 MILLIGRAM(S): 10 INJECTION INTRAVENOUS at 13:23

## 2024-01-14 RX ADMIN — HEPARIN SODIUM 5000 UNIT(S): 5000 INJECTION INTRAVENOUS; SUBCUTANEOUS at 06:18

## 2024-01-14 RX ADMIN — LIDOCAINE 1 PATCH: 4 CREAM TOPICAL at 20:12

## 2024-01-14 RX ADMIN — POLYETHYLENE GLYCOL 3350 17 GRAM(S): 17 POWDER, FOR SOLUTION ORAL at 13:23

## 2024-01-14 RX ADMIN — SODIUM CHLORIDE 50 MILLILITER(S): 9 INJECTION INTRAMUSCULAR; INTRAVENOUS; SUBCUTANEOUS at 06:19

## 2024-01-14 RX ADMIN — Medication 650 MILLIGRAM(S): at 21:40

## 2024-01-14 RX ADMIN — LIDOCAINE 1 PATCH: 4 CREAM TOPICAL at 13:24

## 2024-01-14 RX ADMIN — Medication 650 MILLIGRAM(S): at 22:10

## 2024-01-14 RX ADMIN — ATORVASTATIN CALCIUM 80 MILLIGRAM(S): 80 TABLET, FILM COATED ORAL at 21:35

## 2024-01-14 RX ADMIN — Medication 81 MILLIGRAM(S): at 13:24

## 2024-01-14 NOTE — PROGRESS NOTE ADULT - PROBLEM SELECTOR PLAN 2
2/2 osteoarthritis, possibly exacerbated by weakness iso of recent COVID. No recent trauma/falls. No s/s of systemic infection.   - dsDNA, RF wnl   - XR b/l knee/pelvis: Left knee effusion. No right knee effusion. No fractures or dislocations. Intra-articular tibiofemoral chondrocalcinosis. Tricompartment osteoarthritic changes. No joint margin erosions. Generalized osteopenia otherwise no discrete lytic or blastic lesions. Posteromedial distal left thigh surgical clips.  -CT Pelvis: No fracture. Degenerative changes. Small erosion at the left adductor insertion at the left anterior greater   trochanter. Possibly the sequela of remote injury or inflammatory arthropathy.  Plan:   - DONTAE positive, CCP AB neg  - lidocaine patch, acetaminophen for pain ctrl   - PT recommends rehab  - Outpatient ortho f/u Pt c/o bilateral calf pain for past few days. No swelling. Recent COVID, c/f associated hypercoagulability.   -D dimer 1108  -VA duplex BLE: no evidence of deep vein thrombosis noted in the bilateral lower extremities.

## 2024-01-14 NOTE — PROGRESS NOTE ADULT - PROBLEM SELECTOR PLAN 1
Pt c/o bilateral calf pain for past few days. No swelling. Recent COVID, c/f associated hypercoagulability.   -D dimer 1108  -VA duplex BLE: no evidence of deep vein thrombosis noted in the bilateral lower extremities. 2/2 osteoarthritis, possibly exacerbated by weakness iso of recent COVID. No recent trauma/falls. No s/s of systemic infection.   - dsDNA, RF wnl   - XR b/l knee/pelvis: Left knee effusion. No right knee effusion. No fractures or dislocations. Intra-articular tibiofemoral chondrocalcinosis. Tricompartment osteoarthritic changes. No joint margin erosions. Generalized osteopenia otherwise no discrete lytic or blastic lesions. Posteromedial distal left thigh surgical clips.  -CT Pelvis: No fracture. Degenerative changes. Small erosion at the left adductor insertion at the left anterior greater   trochanter. Possibly the sequela of remote injury or inflammatory arthropathy.  Plan:   - DONTAE positive, CCP AB neg  - lidocaine patch, acetaminophen for pain ctrl. Would likely derive benefit from Voltaren gel, but not on hospital formulary   - MR knees b/l ordered  - Outpatient ortho f/u. Daughter states patient would not agree to knee replacement if offered

## 2024-01-14 NOTE — PROGRESS NOTE ADULT - SUBJECTIVE AND OBJECTIVE BOX
Patient is a 83y old  Female who presents with a chief complaint of Knee pain (13 Jan 2024 12:54)      INTERVAL HPI/OVERNIGHT EVENTS: LUIZA overnight. This afternoon, no complaints noted.       REVIEW OF SYSTEMS:    CONSTITUTIONAL: No weakness, fevers or chills  EYES/ENT: No visual changes;  No vertigo or throat pain   NECK: No pain or stiffness  RESPIRATORY: No cough, wheezing, hemoptysis; No shortness of breath  CARDIOVASCULAR: No chest pain or palpitations  GASTROINTESTINAL: No abdominal or epigastric pain. No nausea, vomiting, or hematemesis; No diarrhea or constipation. No melena or hematochezia.  GENITOURINARY: No dysuria, frequency or hematuria  NEUROLOGICAL: No numbness or weakness  All other review of systems is negative unless indicated above.    FAMILY HISTORY:  No pertinent family history in first degree relatives      T(C): 37 (01-14-24 @ 05:00), Max: 37.1 (01-13-24 @ 20:00)  HR: 91 (01-14-24 @ 05:00) (85 - 91)  BP: 128/57 (01-14-24 @ 05:00) (119/57 - 128/57)  RR: 18 (01-14-24 @ 05:00) (18 - 18)  SpO2: 97% (01-14-24 @ 05:00) (97% - 99%)  Wt(kg): --Vital Signs Last 24 Hrs  T(C): 37 (14 Jan 2024 05:00), Max: 37.1 (13 Jan 2024 20:00)  T(F): 98.6 (14 Jan 2024 05:00), Max: 98.7 (13 Jan 2024 20:00)  HR: 91 (14 Jan 2024 05:00) (85 - 91)  BP: 128/57 (14 Jan 2024 05:00) (119/57 - 128/57)  BP(mean): --  RR: 18 (14 Jan 2024 05:00) (18 - 18)  SpO2: 97% (14 Jan 2024 05:00) (97% - 99%)    Parameters below as of 14 Jan 2024 05:00  Patient On (Oxygen Delivery Method): room air        PHYSICAL EXAM:    General: No acute distress, well-appearing    Eyes: PERRL, EOMI     ENT: MMM, no oropharyngeal lesions or erythema appreciated     Pulm: No increased WOB. CTAB. No wheezing.     CV: RRR. S1&S2+. No M/R/G appreciated.     Abdomen: +BS. Soft, NTND. No organomegaly.     MSK: Nml ROM    Extremities: No peripheral edema or cyanosis.    Neuro: A&Ox3, no focal deficits     Skin: Warm and dry. No visible rash.     Consultant(s) Notes Reviewed:  [x ] YES  [ ] NO  Care Discussed with Consultants/Other Providers [ x] YES  [ ] NO    LABS:                        9.1    7.92  )-----------( 245      ( 14 Jan 2024 07:05 )             28.1     14 Jan 2024 07:05    133    |  97     |  28     ----------------------------<  121    4.7     |  22     |  1.67     Ca    9.3        14 Jan 2024 07:05  Phos  3.6       14 Jan 2024 07:05  Mg     2.10      14 Jan 2024 07:05        CAPILLARY BLOOD GLUCOSE        BLOOD CULTURE      RADIOLOGY & ADDITIONAL TESTS:    Imaging Personally Reviewed:  [ ] YES  [ ] NO  acetaminophen     Tablet .. 650 milliGRAM(s) Oral every 6 hours PRN  aspirin enteric coated 81 milliGRAM(s) Oral daily  atorvastatin 80 milliGRAM(s) Oral at bedtime  benzonatate 100 milliGRAM(s) Oral three times a day PRN  entecavir 0.5 milliGRAM(s) Oral every 72 hours  famotidine    Tablet 20 milliGRAM(s) Oral daily  guaiFENesin  milliGRAM(s) Oral every 12 hours PRN  heparin   Injectable 5000 Unit(s) SubCutaneous every 12 hours  melatonin 3 milliGRAM(s) Oral at bedtime PRN  polyethylene glycol 3350 17 Gram(s) Oral daily      HEALTH ISSUES - PROBLEM Dx:  Bilateral knee pain    CAD (coronary artery disease)    Hepatitis B    Renal insufficiency    Hyponatremia    KALYAN (acute kidney injury)    Bilateral calf pain    Need for prophylactic measure

## 2024-01-14 NOTE — PROGRESS NOTE ADULT - PROBLEM SELECTOR PLAN 3
Cr stable, unclear baseline  -Follows with [facility] as outpatient   -Strict I/Os  -Avoid nephrotoxic agents  -Renally dose meds

## 2024-01-14 NOTE — PROGRESS NOTE ADULT - PROBLEM SELECTOR PLAN 7
DVT ppx: SQH  Diet: DASH  Dispo: [facility] per PT, daughter states she's not sure if she wants patient to go home or to [facility] yet DVT ppx: [facility]  Diet: DASH  Dispo: [facility] per PT, daughter states she's not sure if she wants patient to go home or to [facility] yet DVT ppx: SQH  Diet: DASH  Dispo: [facility] per PT, daughter agreeable

## 2024-01-15 LAB
ANION GAP SERPL CALC-SCNC: 12 MMOL/L — SIGNIFICANT CHANGE UP (ref 7–14)
ANION GAP SERPL CALC-SCNC: 12 MMOL/L — SIGNIFICANT CHANGE UP (ref 7–14)
BUN SERPL-MCNC: 28 MG/DL — HIGH (ref 7–23)
BUN SERPL-MCNC: 28 MG/DL — HIGH (ref 7–23)
CALCIUM SERPL-MCNC: 8.8 MG/DL — SIGNIFICANT CHANGE UP (ref 8.4–10.5)
CALCIUM SERPL-MCNC: 8.8 MG/DL — SIGNIFICANT CHANGE UP (ref 8.4–10.5)
CHLORIDE SERPL-SCNC: 99 MMOL/L — SIGNIFICANT CHANGE UP (ref 98–107)
CHLORIDE SERPL-SCNC: 99 MMOL/L — SIGNIFICANT CHANGE UP (ref 98–107)
CO2 SERPL-SCNC: 23 MMOL/L — SIGNIFICANT CHANGE UP (ref 22–31)
CO2 SERPL-SCNC: 23 MMOL/L — SIGNIFICANT CHANGE UP (ref 22–31)
CREAT SERPL-MCNC: 1.77 MG/DL — HIGH (ref 0.5–1.3)
CREAT SERPL-MCNC: 1.77 MG/DL — HIGH (ref 0.5–1.3)
EGFR: 28 ML/MIN/1.73M2 — LOW
EGFR: 28 ML/MIN/1.73M2 — LOW
GLUCOSE SERPL-MCNC: 113 MG/DL — HIGH (ref 70–99)
GLUCOSE SERPL-MCNC: 113 MG/DL — HIGH (ref 70–99)
HCT VFR BLD CALC: 26.7 % — LOW (ref 34.5–45)
HCT VFR BLD CALC: 26.7 % — LOW (ref 34.5–45)
HGB BLD-MCNC: 8.7 G/DL — LOW (ref 11.5–15.5)
HGB BLD-MCNC: 8.7 G/DL — LOW (ref 11.5–15.5)
MAGNESIUM SERPL-MCNC: 2.1 MG/DL — SIGNIFICANT CHANGE UP (ref 1.6–2.6)
MAGNESIUM SERPL-MCNC: 2.1 MG/DL — SIGNIFICANT CHANGE UP (ref 1.6–2.6)
MCHC RBC-ENTMCNC: 31.1 PG — SIGNIFICANT CHANGE UP (ref 27–34)
MCHC RBC-ENTMCNC: 31.1 PG — SIGNIFICANT CHANGE UP (ref 27–34)
MCHC RBC-ENTMCNC: 32.6 GM/DL — SIGNIFICANT CHANGE UP (ref 32–36)
MCHC RBC-ENTMCNC: 32.6 GM/DL — SIGNIFICANT CHANGE UP (ref 32–36)
MCV RBC AUTO: 95.4 FL — SIGNIFICANT CHANGE UP (ref 80–100)
MCV RBC AUTO: 95.4 FL — SIGNIFICANT CHANGE UP (ref 80–100)
NRBC # BLD: 0 /100 WBCS — SIGNIFICANT CHANGE UP (ref 0–0)
NRBC # BLD: 0 /100 WBCS — SIGNIFICANT CHANGE UP (ref 0–0)
NRBC # FLD: 0 K/UL — SIGNIFICANT CHANGE UP (ref 0–0)
NRBC # FLD: 0 K/UL — SIGNIFICANT CHANGE UP (ref 0–0)
PHOSPHATE SERPL-MCNC: 3.3 MG/DL — SIGNIFICANT CHANGE UP (ref 2.5–4.5)
PHOSPHATE SERPL-MCNC: 3.3 MG/DL — SIGNIFICANT CHANGE UP (ref 2.5–4.5)
PLATELET # BLD AUTO: 267 K/UL — SIGNIFICANT CHANGE UP (ref 150–400)
PLATELET # BLD AUTO: 267 K/UL — SIGNIFICANT CHANGE UP (ref 150–400)
POTASSIUM SERPL-MCNC: 4.3 MMOL/L — SIGNIFICANT CHANGE UP (ref 3.5–5.3)
POTASSIUM SERPL-MCNC: 4.3 MMOL/L — SIGNIFICANT CHANGE UP (ref 3.5–5.3)
POTASSIUM SERPL-SCNC: 4.3 MMOL/L — SIGNIFICANT CHANGE UP (ref 3.5–5.3)
POTASSIUM SERPL-SCNC: 4.3 MMOL/L — SIGNIFICANT CHANGE UP (ref 3.5–5.3)
RBC # BLD: 2.8 M/UL — LOW (ref 3.8–5.2)
RBC # BLD: 2.8 M/UL — LOW (ref 3.8–5.2)
RBC # FLD: 12.6 % — SIGNIFICANT CHANGE UP (ref 10.3–14.5)
RBC # FLD: 12.6 % — SIGNIFICANT CHANGE UP (ref 10.3–14.5)
SODIUM SERPL-SCNC: 134 MMOL/L — LOW (ref 135–145)
SODIUM SERPL-SCNC: 134 MMOL/L — LOW (ref 135–145)
WBC # BLD: 8.32 K/UL — SIGNIFICANT CHANGE UP (ref 3.8–10.5)
WBC # BLD: 8.32 K/UL — SIGNIFICANT CHANGE UP (ref 3.8–10.5)
WBC # FLD AUTO: 8.32 K/UL — SIGNIFICANT CHANGE UP (ref 3.8–10.5)
WBC # FLD AUTO: 8.32 K/UL — SIGNIFICANT CHANGE UP (ref 3.8–10.5)

## 2024-01-15 PROCEDURE — 99232 SBSQ HOSP IP/OBS MODERATE 35: CPT

## 2024-01-15 RX ORDER — SODIUM CHLORIDE 9 MG/ML
1000 INJECTION INTRAMUSCULAR; INTRAVENOUS; SUBCUTANEOUS
Refills: 0 | Status: DISCONTINUED | OUTPATIENT
Start: 2024-01-15 | End: 2024-01-18

## 2024-01-15 RX ADMIN — HEPARIN SODIUM 5000 UNIT(S): 5000 INJECTION INTRAVENOUS; SUBCUTANEOUS at 05:15

## 2024-01-15 RX ADMIN — SODIUM CHLORIDE 50 MILLILITER(S): 9 INJECTION INTRAMUSCULAR; INTRAVENOUS; SUBCUTANEOUS at 15:27

## 2024-01-15 RX ADMIN — LIDOCAINE 1 PATCH: 4 CREAM TOPICAL at 15:25

## 2024-01-15 RX ADMIN — LIDOCAINE 1 PATCH: 4 CREAM TOPICAL at 01:30

## 2024-01-15 RX ADMIN — LIDOCAINE 1 PATCH: 4 CREAM TOPICAL at 19:19

## 2024-01-15 RX ADMIN — FAMOTIDINE 20 MILLIGRAM(S): 10 INJECTION INTRAVENOUS at 15:26

## 2024-01-15 RX ADMIN — ATORVASTATIN CALCIUM 80 MILLIGRAM(S): 80 TABLET, FILM COATED ORAL at 22:36

## 2024-01-15 RX ADMIN — Medication 81 MILLIGRAM(S): at 15:26

## 2024-01-15 RX ADMIN — POLYETHYLENE GLYCOL 3350 17 GRAM(S): 17 POWDER, FOR SOLUTION ORAL at 15:26

## 2024-01-15 NOTE — PROGRESS NOTE ADULT - SUBJECTIVE AND OBJECTIVE BOX
Patient is a 83y old  Female who presents with a chief complaint of Knee pain (14 Jan 2024 12:18)      INTERVAL HPI/OVERNIGHT EVENTS: LUIZA overnight. This morning, c/o pain in R knee.      REVIEW OF SYSTEMS:    CONSTITUTIONAL: No weakness, fevers or chills  EYES/ENT: No visual changes;  No vertigo or throat pain   NECK: No pain or stiffness  RESPIRATORY: No cough, wheezing, hemoptysis; No shortness of breath  CARDIOVASCULAR: No chest pain or palpitations  GASTROINTESTINAL: No abdominal or epigastric pain. No nausea, vomiting, or hematemesis; No diarrhea or constipation. No melena or hematochezia.  GENITOURINARY: No dysuria, frequency or hematuria  NEUROLOGICAL: No numbness or weakness  All other review of systems is negative unless indicated above.    FAMILY HISTORY:  No pertinent family history in first degree relatives      T(C): 36.5 (01-15-24 @ 05:36), Max: 37.7 (01-14-24 @ 20:12)  HR: 83 (01-15-24 @ 05:36) (83 - 99)  BP: 122/56 (01-15-24 @ 05:36) (121/53 - 140/53)  RR: 18 (01-15-24 @ 05:36) (18 - 18)  SpO2: 97% (01-15-24 @ 05:36) (97% - 98%)  Wt(kg): --Vital Signs Last 24 Hrs  T(C): 36.5 (15 Kwaem 2024 05:36), Max: 37.7 (14 Jan 2024 20:12)  T(F): 97.7 (15 Kwame 2024 05:36), Max: 99.8 (14 Jan 2024 20:12)  HR: 83 (15 Kwame 2024 05:36) (83 - 99)  BP: 122/56 (15 Kwame 2024 05:36) (121/53 - 140/53)  BP(mean): --  RR: 18 (15 Kwame 2024 05:36) (18 - 18)  SpO2: 97% (15 Kwame 2024 05:36) (97% - 98%)    Parameters below as of 15 Kwame 2024 05:36  Patient On (Oxygen Delivery Method): room air        PHYSICAL EXAM:    General: No acute distress, well-appearing    Eyes: PERRL, EOMI     ENT: MMM, no oropharyngeal lesions or erythema appreciated     Pulm: No increased WOB. CTAB. No wheezing.     CV: RRR. S1&S2+. No M/R/G appreciated.     Abdomen: +BS. Soft, NTND. No organomegaly.     MSK: Nml ROM    Extremities: No peripheral edema or cyanosis.    Neuro: A&Ox3, no focal deficits     Skin: Warm and dry. No visible rash.       Consultant(s) Notes Reviewed:  [x ] YES  [ ] NO  Care Discussed with Consultants/Other Providers [ x] YES  [ ] NO    LABS:                        8.7    8.32  )-----------( 267      ( 15 Kwame 2024 07:02 )             26.7     15 Kwame 2024 07:02    134    |  99     |  28     ----------------------------<  113    4.3     |  23     |  1.77     Ca    8.8        15 Kwame 2024 07:02  Phos  3.3       15 Kwame 2024 07:02  Mg     2.10      15 Kwame 2024 07:02        CAPILLARY BLOOD GLUCOSE        BLOOD CULTURE      RADIOLOGY & ADDITIONAL TESTS:    Imaging Personally Reviewed:  [ ] YES  [ ] NO  acetaminophen     Tablet .. 650 milliGRAM(s) Oral every 6 hours PRN  aspirin enteric coated 81 milliGRAM(s) Oral daily  atorvastatin 80 milliGRAM(s) Oral at bedtime  benzonatate 100 milliGRAM(s) Oral three times a day PRN  entecavir 0.5 milliGRAM(s) Oral every 72 hours  famotidine    Tablet 20 milliGRAM(s) Oral daily  guaiFENesin  milliGRAM(s) Oral every 12 hours PRN  heparin   Injectable 5000 Unit(s) SubCutaneous every 12 hours  lidocaine   4% Patch 1 Patch Transdermal every 24 hours  melatonin 3 milliGRAM(s) Oral at bedtime PRN  polyethylene glycol 3350 17 Gram(s) Oral daily  sodium chloride 0.9%. 1000 milliLiter(s) IV Continuous <Continuous>      HEALTH ISSUES - PROBLEM Dx:  Bilateral knee pain    Bilateral calf pain    Stage 3 chronic kidney disease    Hyponatremia    CAD (coronary artery disease)    Hepatitis B    Need for prophylactic measure    Renal insufficiency    KALYAN (acute kidney injury)           Patient is a 83y old  Female who presents with a chief complaint of Knee pain (14 Jan 2024 12:18)      INTERVAL HPI/OVERNIGHT EVENTS: LUIZA overnight. This morning, c/o pain in R knee.      REVIEW OF SYSTEMS:    CONSTITUTIONAL: No weakness, fevers or chills  EYES/ENT: No visual changes;  No vertigo or throat pain   NECK: No pain or stiffness  RESPIRATORY: No cough, wheezing, hemoptysis; No shortness of breath  CARDIOVASCULAR: No chest pain or palpitations  GASTROINTESTINAL: No abdominal or epigastric pain. No nausea, vomiting, or hematemesis; No diarrhea or constipation. No melena or hematochezia.  GENITOURINARY: No dysuria, frequency or hematuria  NEUROLOGICAL: No numbness or weakness  All other review of systems is negative unless indicated above.    FAMILY HISTORY:  No pertinent family history in first degree relatives      T(C): 36.5 (01-15-24 @ 05:36), Max: 37.7 (01-14-24 @ 20:12)  HR: 83 (01-15-24 @ 05:36) (83 - 99)  BP: 122/56 (01-15-24 @ 05:36) (121/53 - 140/53)  RR: 18 (01-15-24 @ 05:36) (18 - 18)  SpO2: 97% (01-15-24 @ 05:36) (97% - 98%)  Wt(kg): --Vital Signs Last 24 Hrs  T(C): 36.5 (15 Kwame 2024 05:36), Max: 37.7 (14 Jan 2024 20:12)  T(F): 97.7 (15 Kwame 2024 05:36), Max: 99.8 (14 Jan 2024 20:12)  HR: 83 (15 Kwame 2024 05:36) (83 - 99)  BP: 122/56 (15 Kwame 2024 05:36) (121/53 - 140/53)  BP(mean): --  RR: 18 (15 Kwame 2024 05:36) (18 - 18)  SpO2: 97% (15 Kwame 2024 05:36) (97% - 98%)    Parameters below as of 15 Kwame 2024 05:36  Patient On (Oxygen Delivery Method): room air        PHYSICAL EXAM:    General: No acute distress, well-appearing    Eyes: PERRL, EOMI     ENT: MMM, no oropharyngeal lesions or erythema appreciated     Pulm: No increased WOB. CTAB. No wheezing.     CV: RRR. S1&S2+. No M/R/G appreciated.     Abdomen: +BS. Soft, NTND. No organomegaly.     MSK: Nml ROM    Extremities: No peripheral edema or cyanosis.    Neuro: A&Ox3, no focal deficits     Skin: Warm and dry. No visible rash.       Consultant(s) Notes Reviewed:  [x ] YES  [ ] NO  Care Discussed with Consultants/Other Providers [ x] YES  [ ] NO    LABS:                        8.7    8.32  )-----------( 267      ( 15 Kwame 2024 07:02 )             26.7     15 Kwame 2024 07:02    134    |  99     |  28     ----------------------------<  113    4.3     |  23     |  1.77     Ca    8.8        15 Kwame 2024 07:02  Phos  3.3       15 Kwame 2024 07:02  Mg     2.10      15 Kwame 2024 07:02        CAPILLARY BLOOD GLUCOSE        BLOOD CULTURE      RADIOLOGY & ADDITIONAL TESTS:    Imaging Personally Reviewed:  [ ] YES  [ ] NO  acetaminophen     Tablet .. 650 milliGRAM(s) Oral every 6 hours PRN  aspirin enteric coated 81 milliGRAM(s) Oral daily  atorvastatin 80 milliGRAM(s) Oral at bedtime  benzonatate 100 milliGRAM(s) Oral three times a day PRN  entecavir 0.5 milliGRAM(s) Oral every 72 hours  famotidine    Tablet 20 milliGRAM(s) Oral daily  guaiFENesin  milliGRAM(s) Oral every 12 hours PRN  heparin   Injectable 5000 Unit(s) SubCutaneous every 12 hours  lidocaine   4% Patch 1 Patch Transdermal every 24 hours  melatonin 3 milliGRAM(s) Oral at bedtime PRN  polyethylene glycol 3350 17 Gram(s) Oral daily  sodium chloride 0.9%. 1000 milliLiter(s) IV Continuous <Continuous>      HEALTH ISSUES - PROBLEM Dx:  Bilateral knee pain    Bilateral calf pain    Stage 3 chronic kidney disease    Hyponatremia    CAD (coronary artery disease)    Hepatitis B    Need for prophylactic measure    Renal insufficiency    KALYAN (acute kidney injury)

## 2024-01-15 NOTE — PROGRESS NOTE ADULT - PROBLEM SELECTOR PLAN 2
Pt c/o bilateral calf pain for past few days. No swelling. Recent COVID, c/f associated hypercoagulability.   -D dimer 1108  -VA duplex BLE: no evidence of deep vein thrombosis noted in the bilateral lower extremities.

## 2024-01-15 NOTE — PROGRESS NOTE ADULT - PROBLEM SELECTOR PLAN 1
2/2 osteoarthritis, possibly exacerbated by weakness iso of recent COVID. No recent trauma/falls. No s/s of systemic infection.   - dsDNA, RF wnl   - XR b/l knee/pelvis: Left knee effusion. No right knee effusion. No fractures or dislocations. Intra-articular tibiofemoral chondrocalcinosis. Tricompartment osteoarthritic changes. No joint margin erosions. Generalized osteopenia otherwise no discrete lytic or blastic lesions. Posteromedial distal left thigh surgical clips.  -CT Pelvis: No fracture. Degenerative changes. Small erosion at the left adductor insertion at the left anterior greater   trochanter. Possibly the sequela of remote injury or inflammatory arthropathy.  Plan:   - DONTAE positive, CCP AB neg  - lidocaine patch, acetaminophen for pain ctrl. Would likely derive benefit from Voltaren gel, but not on hospital formulary. No po NSAIDs due to CKD  - MR knees b/l ordered  - Outpatient ortho f/u. Daughter states patient would not agree to knee replacement if offered

## 2024-01-16 DIAGNOSIS — N28.9 DISORDER OF KIDNEY AND URETER, UNSPECIFIED: ICD-10-CM

## 2024-01-16 PROCEDURE — 99232 SBSQ HOSP IP/OBS MODERATE 35: CPT

## 2024-01-16 RX ADMIN — Medication 650 MILLIGRAM(S): at 15:10

## 2024-01-16 RX ADMIN — LIDOCAINE 1 PATCH: 4 CREAM TOPICAL at 18:10

## 2024-01-16 RX ADMIN — ATORVASTATIN CALCIUM 80 MILLIGRAM(S): 80 TABLET, FILM COATED ORAL at 22:24

## 2024-01-16 RX ADMIN — LIDOCAINE 1 PATCH: 4 CREAM TOPICAL at 03:15

## 2024-01-16 RX ADMIN — HEPARIN SODIUM 5000 UNIT(S): 5000 INJECTION INTRAVENOUS; SUBCUTANEOUS at 18:01

## 2024-01-16 RX ADMIN — Medication 81 MILLIGRAM(S): at 12:11

## 2024-01-16 RX ADMIN — Medication 650 MILLIGRAM(S): at 14:24

## 2024-01-16 RX ADMIN — HEPARIN SODIUM 5000 UNIT(S): 5000 INJECTION INTRAVENOUS; SUBCUTANEOUS at 06:04

## 2024-01-16 RX ADMIN — ENTECAVIR 0.5 MILLIGRAM(S): 0.5 TABLET ORAL at 06:03

## 2024-01-16 RX ADMIN — FAMOTIDINE 20 MILLIGRAM(S): 10 INJECTION INTRAVENOUS at 12:11

## 2024-01-16 RX ADMIN — LIDOCAINE 1 PATCH: 4 CREAM TOPICAL at 14:18

## 2024-01-16 RX ADMIN — POLYETHYLENE GLYCOL 3350 17 GRAM(S): 17 POWDER, FOR SOLUTION ORAL at 12:09

## 2024-01-16 NOTE — DIETITIAN NUTRITION RISK NOTIFICATION - ADDITIONAL COMMENTS/DIETITIAN RECOMMENDATIONS
Please see Dietitian Initial Assessment for complete recommendations.  Peggy Pace, SHIRA, CDN #[id number]  Also available on [employer] Teams  Please see Dietitian Initial Assessment for complete recommendations.  Peggy Pace, SHIRA, CDN #[id number]  Also available on [employer] Teams

## 2024-01-16 NOTE — DIETITIAN INITIAL EVALUATION ADULT - OTHER INFO
Per chart, pt is 83 year old female PMH CAD s/p CABG, HLD, chronic hepatitis B s/p tx, recent COVID infection presenting with bilateral knee/calf pain.    Pt confirms NKFA, denies difficulties chewing/swallowing. Pt lives at home with , has an aide who assists with care. Pt reports generally good appetite/PO intake PTA, consumes regular diet at baseline. Pt continues to eat well in house, tolerating diet. Pt consuming Glucerna shake brought in from home during time of visit. Pt does not vocalize any food preferences at this time as she is anticipating discharge to [facility]. No noted GI distress, unknown last BM; ordered for Miralax 17 gm qD.  Per chart, pt is 83 year old female PMH CAD s/p CABG, HLD, chronic hepatitis B s/p tx, recent COVID infection presenting with bilateral knee/calf pain.    Pt confirms NKFA, denies difficulties chewing/swallowing. Pt lives at home with , has an aide who assists with care. Pt reports generally good appetite/PO intake PTA, consumes regular diet at baseline. Pt continues to eat well in house, tolerating diet. Pt consuming Glucerna shake brought in from home during time of visit. Pt does not vocalize any food preferences at this time as she is anticipating discharge to [facility]. No noted GI distress, unknown last BM; ordered for Miralax 17 gm qD.

## 2024-01-16 NOTE — PROGRESS NOTE ADULT - SUBJECTIVE AND OBJECTIVE BOX
Patient is a 83y old  Female who presents with a chief complaint of Knee pain (15 Kwame 2024 10:38)      SUBJECTIVE / OVERNIGHT EVENTS:    MEDICATIONS  (STANDING):  aspirin enteric coated 81 milliGRAM(s) Oral daily  atorvastatin 80 milliGRAM(s) Oral at bedtime  entecavir 0.5 milliGRAM(s) Oral every 72 hours  famotidine    Tablet 20 milliGRAM(s) Oral daily  heparin   Injectable 5000 Unit(s) SubCutaneous every 12 hours  lidocaine   4% Patch 1 Patch Transdermal every 24 hours  polyethylene glycol 3350 17 Gram(s) Oral daily  sodium chloride 0.9%. 1000 milliLiter(s) (50 mL/Hr) IV Continuous <Continuous>    MEDICATIONS  (PRN):  acetaminophen     Tablet .. 650 milliGRAM(s) Oral every 6 hours PRN Temp greater or equal to 38C (100.4F), Mild Pain (1 - 3)  benzonatate 100 milliGRAM(s) Oral three times a day PRN Cough  guaiFENesin  milliGRAM(s) Oral every 12 hours PRN Cough  melatonin 3 milliGRAM(s) Oral at bedtime PRN Insomnia      Vital Signs Last 24 Hrs  T(C): 36.9 (16 Jan 2024 06:00), Max: 37.1 (15 Kwame 2024 13:29)  T(F): 98.5 (16 Jan 2024 06:00), Max: 98.7 (15 Kwame 2024 13:29)  HR: 95 (16 Jan 2024 06:00) (82 - 98)  BP: 101/74 (16 Jan 2024 06:00) (101/74 - 132/52)  BP(mean): 79 (16 Jan 2024 06:00) (67 - 79)  RR: 18 (16 Jan 2024 06:00) (16 - 18)  SpO2: 99% (16 Jan 2024 06:00) (96% - 100%)    Parameters below as of 16 Jan 2024 06:00  Patient On (Oxygen Delivery Method): room air      CAPILLARY BLOOD GLUCOSE        I&O's Summary      PHYSICAL EXAM:  GENERAL: NAD, well-developed  HEAD:  Atraumatic, Normocephalic  EYES: EOMI, PERRLA, conjunctiva and sclera clear  NECK: Supple, No JVD  CHEST/LUNG: Clear to auscultation bilaterally; No wheeze  HEART: Regular rate and rhythm; No murmurs, rubs, or gallops  ABDOMEN: Soft, Nontender, Nondistended; Bowel sounds present  EXTREMITIES:  2+ Peripheral Pulses, No clubbing, cyanosis, or edema  PSYCH: AAOx3  NEUROLOGY: non-focal  SKIN: No rashes or lesions    LABS:                        8.7    8.32  )-----------( 267      ( 15 Kwame 2024 07:02 )             26.7     01-15    134<L>  |  99  |  28<H>  ----------------------------<  113<H>  4.3   |  23  |  1.77<H>    Ca    8.8      15 Kwame 2024 07:02  Phos  3.3     01-15  Mg     2.10     01-15            Urinalysis Basic - ( 15 Kwame 2024 07:02 )    Color: x / Appearance: x / SG: x / pH: x  Gluc: 113 mg/dL / Ketone: x  / Bili: x / Urobili: x   Blood: x / Protein: x / Nitrite: x   Leuk Esterase: x / RBC: x / WBC x   Sq Epi: x / Non Sq Epi: x / Bacteria: x        RADIOLOGY & ADDITIONAL TESTS:    Imaging Personally Reviewed:    Consultant(s) Notes Reviewed:      Care Discussed with Consultants/Other Providers:   Patient is a 83y old  Female who presents with a chief complaint of Knee pain (15 Kwame 2024 10:38)      SUBJECTIVE / OVERNIGHT EVENTS: patient seen and examined by bedside, pt alert and awake, c/o pain in rt knee, denies headache, dizziness, SOB, CP, Palpitations , N/V/D, abdominal pain      MEDICATIONS  (STANDING):  aspirin enteric coated 81 milliGRAM(s) Oral daily  atorvastatin 80 milliGRAM(s) Oral at bedtime  entecavir 0.5 milliGRAM(s) Oral every 72 hours  famotidine    Tablet 20 milliGRAM(s) Oral daily  heparin   Injectable 5000 Unit(s) SubCutaneous every 12 hours  lidocaine   4% Patch 1 Patch Transdermal every 24 hours  polyethylene glycol 3350 17 Gram(s) Oral daily  sodium chloride 0.9%. 1000 milliLiter(s) (50 mL/Hr) IV Continuous <Continuous>    MEDICATIONS  (PRN):  acetaminophen     Tablet .. 650 milliGRAM(s) Oral every 6 hours PRN Temp greater or equal to 38C (100.4F), Mild Pain (1 - 3)  benzonatate 100 milliGRAM(s) Oral three times a day PRN Cough  guaiFENesin  milliGRAM(s) Oral every 12 hours PRN Cough  melatonin 3 milliGRAM(s) Oral at bedtime PRN Insomnia      Vital Signs Last 24 Hrs  T(C): 36.9 (16 Jan 2024 06:00), Max: 37.1 (15 Kwame 2024 13:29)  T(F): 98.5 (16 Jan 2024 06:00), Max: 98.7 (15 Kwame 2024 13:29)  HR: 95 (16 Jan 2024 06:00) (82 - 98)  BP: 101/74 (16 Jan 2024 06:00) (101/74 - 132/52)  BP(mean): 79 (16 Jan 2024 06:00) (67 - 79)  RR: 18 (16 Jan 2024 06:00) (16 - 18)  SpO2: 99% (16 Jan 2024 06:00) (96% - 100%)    Parameters below as of 16 Jan 2024 06:00  Patient On (Oxygen Delivery Method): room air      ]    PHYSICAL EXAM:    General: No acute distress noted     Eyes: PERRL, EOMI     ENT: MMM, no oropharyngeal lesions or erythema appreciated     Pulm: No increased WOB. CTAB. No wheezing.     CV: RRR. S1&S2+. No M/R/G appreciated.     Abdomen: +BS. Soft, NTND. No organomegaly.     MSK: Nml ROM    Extremities: No peripheral edema or cyanosis.    Neuro: A&Ox3, no focal deficits     Skin: Warm and dry. No visible rash.       LABS:                        8.7    8.32  )-----------( 267      ( 15 Kwame 2024 07:02 )             26.7     01-15    134<L>  |  99  |  28<H>  ----------------------------<  113<H>  4.3   |  23  |  1.77<H>    Ca    8.8      15 Kwame 2024 07:02  Phos  3.3     01-15  Mg     2.10     01-15            Urinalysis Basic - ( 15 Kwame 2024 07:02 )    Color: x / Appearance: x / SG: x / pH: x  Gluc: 113 mg/dL / Ketone: x  / Bili: x / Urobili: x   Blood: x / Protein: x / Nitrite: x   Leuk Esterase: x / RBC: x / WBC x   Sq Epi: x / Non Sq Epi: x / Bacteria: x        RADIOLOGY & ADDITIONAL TESTS:    Imaging Personally Reviewed:    Consultant(s) Notes Reviewed:      Care Discussed with Consultants/Other Providers:

## 2024-01-16 NOTE — DIETITIAN INITIAL EVALUATION ADULT - ADD RECOMMEND
Routing to MD fei Hsu. Requesting 90 day supply.   
Recommend continue current diet.  Recommend addition of Glucerna 1 PO 2x daily (provides 220 kcal, 10 gm protein per 8oz serving).

## 2024-01-16 NOTE — PROGRESS NOTE ADULT - PROBLEM SELECTOR PLAN 3
Cr stable, unclear baseline  -Follows with Leela Em as outpatient   -Strict I/Os  -Avoid nephrotoxic agents  -Renally dose meds Cr stable, unclear baseline, slightly uptrended today   -Follows with Leela Em as outpatient   -Strict I/Os  -Avoid nephrotoxic agents  -Renally dose meds

## 2024-01-16 NOTE — DIETITIAN INITIAL EVALUATION ADULT - OTHER CALCULATIONS
Dosing weight (1/11) 89.2 lbs / 40.5 kg.  Recent chart weight (4/28) 99 lbs.  No dosing height available at this time, obtained via HIE 60 inches.  Ideal Body Weight: 100 lbs / 45.5 kg +/-10%

## 2024-01-16 NOTE — PROGRESS NOTE ADULT - PROBLEM SELECTOR PLAN 7
DVT ppx: SQH  Diet: DASH  Dispo: MELISSA per PT, daughter agreeable DVT ppx: SQH  Diet: DASH  Dispo: MELISSA per PT, daughter agreeable  plan of care d/w pt and ACP

## 2024-01-16 NOTE — DIETITIAN INITIAL EVALUATION ADULT - NAME AND PHONE
Peggy Pace RDN, CDN #[id number]  Also available on [employer] Teams  Peggy Pace RDN, CDN #[id number]  Also available on [employer] Teams

## 2024-01-17 LAB
ANION GAP SERPL CALC-SCNC: 12 MMOL/L — SIGNIFICANT CHANGE UP (ref 7–14)
BUN SERPL-MCNC: 25 MG/DL — HIGH (ref 7–23)
CALCIUM SERPL-MCNC: 9 MG/DL — SIGNIFICANT CHANGE UP (ref 8.4–10.5)
CHLORIDE SERPL-SCNC: 96 MMOL/L — LOW (ref 98–107)
CO2 SERPL-SCNC: 24 MMOL/L — SIGNIFICANT CHANGE UP (ref 22–31)
CREAT SERPL-MCNC: 1.54 MG/DL — HIGH (ref 0.5–1.3)
EGFR: 33 ML/MIN/1.73M2 — LOW
GLUCOSE SERPL-MCNC: 193 MG/DL — HIGH (ref 70–99)
MAGNESIUM SERPL-MCNC: 2.2 MG/DL — SIGNIFICANT CHANGE UP (ref 1.6–2.6)
PHOSPHATE SERPL-MCNC: 3.3 MG/DL — SIGNIFICANT CHANGE UP (ref 2.5–4.5)
POTASSIUM SERPL-MCNC: 4.4 MMOL/L — SIGNIFICANT CHANGE UP (ref 3.5–5.3)
POTASSIUM SERPL-SCNC: 4.4 MMOL/L — SIGNIFICANT CHANGE UP (ref 3.5–5.3)
SODIUM SERPL-SCNC: 132 MMOL/L — LOW (ref 135–145)

## 2024-01-17 PROCEDURE — 99232 SBSQ HOSP IP/OBS MODERATE 35: CPT

## 2024-01-17 RX ADMIN — LIDOCAINE 1 PATCH: 4 CREAM TOPICAL at 17:42

## 2024-01-17 RX ADMIN — HEPARIN SODIUM 5000 UNIT(S): 5000 INJECTION INTRAVENOUS; SUBCUTANEOUS at 06:00

## 2024-01-17 RX ADMIN — ATORVASTATIN CALCIUM 80 MILLIGRAM(S): 80 TABLET, FILM COATED ORAL at 22:56

## 2024-01-17 RX ADMIN — LIDOCAINE 1 PATCH: 4 CREAM TOPICAL at 02:11

## 2024-01-17 RX ADMIN — HEPARIN SODIUM 5000 UNIT(S): 5000 INJECTION INTRAVENOUS; SUBCUTANEOUS at 17:41

## 2024-01-17 RX ADMIN — Medication 81 MILLIGRAM(S): at 11:45

## 2024-01-17 RX ADMIN — Medication 650 MILLIGRAM(S): at 11:47

## 2024-01-17 RX ADMIN — Medication 650 MILLIGRAM(S): at 12:47

## 2024-01-17 RX ADMIN — FAMOTIDINE 20 MILLIGRAM(S): 10 INJECTION INTRAVENOUS at 11:46

## 2024-01-17 NOTE — PROGRESS NOTE ADULT - SUBJECTIVE AND OBJECTIVE BOX
Patient is a 83y old  Female who presents with a chief complaint of Pain of right hip     (16 Jan 2024 13:59)      SUBJECTIVE / OVERNIGHT EVENTS:    MEDICATIONS  (STANDING):  aspirin enteric coated 81 milliGRAM(s) Oral daily  atorvastatin 80 milliGRAM(s) Oral at bedtime  entecavir 0.5 milliGRAM(s) Oral every 72 hours  famotidine    Tablet 20 milliGRAM(s) Oral daily  heparin   Injectable 5000 Unit(s) SubCutaneous every 12 hours  lidocaine   4% Patch 1 Patch Transdermal every 24 hours  polyethylene glycol 3350 17 Gram(s) Oral daily  sodium chloride 0.9%. 1000 milliLiter(s) (50 mL/Hr) IV Continuous <Continuous>    MEDICATIONS  (PRN):  acetaminophen     Tablet .. 650 milliGRAM(s) Oral every 6 hours PRN Temp greater or equal to 38C (100.4F), Mild Pain (1 - 3)  benzonatate 100 milliGRAM(s) Oral three times a day PRN Cough  guaiFENesin  milliGRAM(s) Oral every 12 hours PRN Cough  melatonin 3 milliGRAM(s) Oral at bedtime PRN Insomnia      Vital Signs Last 24 Hrs  T(C): 37.1 (17 Jan 2024 05:57), Max: 37.1 (17 Jan 2024 05:57)  T(F): 98.8 (17 Jan 2024 05:57), Max: 98.8 (17 Jan 2024 05:57)  HR: 86 (17 Jan 2024 05:57) (80 - 91)  BP: 115/47 (17 Jan 2024 05:57) (115/45 - 135/45)  BP(mean): --  RR: 17 (17 Jan 2024 05:57) (17 - 18)  SpO2: 96% (17 Jan 2024 05:57) (96% - 100%)    Parameters below as of 16 Jan 2024 21:09  Patient On (Oxygen Delivery Method): room air      CAPILLARY BLOOD GLUCOSE        I&O's Summary    16 Jan 2024 07:01  -  17 Jan 2024 07:00  --------------------------------------------------------  IN: 400 mL / OUT: 0 mL / NET: 400 mL        PHYSICAL EXAM:  GENERAL: NAD, well-developed  HEAD:  Atraumatic, Normocephalic  EYES: EOMI, PERRLA, conjunctiva and sclera clear  NECK: Supple, No JVD  CHEST/LUNG: Clear to auscultation bilaterally; No wheeze  HEART: Regular rate and rhythm; No murmurs, rubs, or gallops  ABDOMEN: Soft, Nontender, Nondistended; Bowel sounds present  EXTREMITIES:  2+ Peripheral Pulses, No clubbing, cyanosis, or edema  PSYCH: AAOx3  NEUROLOGY: non-focal  SKIN: No rashes or lesions    LABS:                    RADIOLOGY & ADDITIONAL TESTS:    Imaging Personally Reviewed:    Consultant(s) Notes Reviewed:      Care Discussed with Consultants/Other Providers:   Patient is a 83y old  Female who presents with a chief complaint of Pain of right hip     (16 Jan 2024 13:59)      SUBJECTIVE / OVERNIGHT EVENTS: patient seen and examined by bedside, pt says knee pain improved from before but   rt knee still with pain, denies headache, dizziness, SOB, CP, Palpitations , N/V/D, abdominal pain        MEDICATIONS  (STANDING):  aspirin enteric coated 81 milliGRAM(s) Oral daily  atorvastatin 80 milliGRAM(s) Oral at bedtime  entecavir 0.5 milliGRAM(s) Oral every 72 hours  famotidine    Tablet 20 milliGRAM(s) Oral daily  heparin   Injectable 5000 Unit(s) SubCutaneous every 12 hours  lidocaine   4% Patch 1 Patch Transdermal every 24 hours  polyethylene glycol 3350 17 Gram(s) Oral daily  sodium chloride 0.9%. 1000 milliLiter(s) (50 mL/Hr) IV Continuous <Continuous>    MEDICATIONS  (PRN):  acetaminophen     Tablet .. 650 milliGRAM(s) Oral every 6 hours PRN Temp greater or equal to 38C (100.4F), Mild Pain (1 - 3)  benzonatate 100 milliGRAM(s) Oral three times a day PRN Cough  guaiFENesin  milliGRAM(s) Oral every 12 hours PRN Cough  melatonin 3 milliGRAM(s) Oral at bedtime PRN Insomnia      Vital Signs Last 24 Hrs  T(C): 37.1 (17 Jan 2024 05:57), Max: 37.1 (17 Jan 2024 05:57)  T(F): 98.8 (17 Jan 2024 05:57), Max: 98.8 (17 Jan 2024 05:57)  HR: 86 (17 Jan 2024 05:57) (80 - 91)  BP: 115/47 (17 Jan 2024 05:57) (115/45 - 135/45)  BP(mean): --  RR: 17 (17 Jan 2024 05:57) (17 - 18)  SpO2: 96% (17 Jan 2024 05:57) (96% - 100%)    Parameters below as of 16 Jan 2024 21:09  Patient On (Oxygen Delivery Method): room air      CAPILLARY BLOOD GLUCOSE        I&O's Summary    16 Jan 2024 07:01  -  17 Jan 2024 07:00  --------------------------------------------------------  IN: 400 mL / OUT: 0 mL / NET: 400 mL        PHYSICAL EXAM:    General: No acute distress noted     Eyes: PERRL, EOMI     ENT: MMM, no oropharyngeal lesions or erythema appreciated     Pulm: No increased WOB. CTAB. No wheezing.     CV: RRR. S1&S2+. No M/R/G appreciated.     Abdomen: +BS. Soft, NTND. No organomegaly.     MSK: Nml ROM    Extremities: No peripheral edema or cyanosis.    Neuro: A&Ox3, no focal deficits     Skin: Warm and dry. No visible rash.       LABS:        01-17    132<L>  |  96<L>  |  25<H>  ----------------------------<  193<H>  4.4   |  24  |  1.54<H>    Ca    9.0      17 Jan 2024 11:39  Phos  3.3     01-17  Mg     2.20     01-17                RADIOLOGY & ADDITIONAL TESTS:    Imaging Personally Reviewed:    Consultant(s) Notes Reviewed:      Care Discussed with Consultants/Other Providers:

## 2024-01-17 NOTE — PROGRESS NOTE ADULT - PROBLEM SELECTOR PLAN 3
Cr stable, unclear baseline, slightly uptrended today   -Follows with Leela Em as outpatient   -Strict I/Os  -Avoid nephrotoxic agents  -Renally dose meds Cr stable, unclear baseline,   -Follows with Leela Em as outpatient   -Strict I/Os  -Avoid nephrotoxic agents  -Renally dose meds

## 2024-01-18 VITALS
RESPIRATION RATE: 18 BRPM | HEART RATE: 91 BPM | DIASTOLIC BLOOD PRESSURE: 46 MMHG | TEMPERATURE: 99 F | OXYGEN SATURATION: 100 % | SYSTOLIC BLOOD PRESSURE: 127 MMHG

## 2024-01-18 PROCEDURE — 99239 HOSP IP/OBS DSCHRG MGMT >30: CPT

## 2024-01-18 RX ORDER — HEPARIN SODIUM 5000 [USP'U]/ML
5000 INJECTION INTRAVENOUS; SUBCUTANEOUS
Qty: 0 | Refills: 0 | DISCHARGE
Start: 2024-01-18

## 2024-01-18 RX ORDER — FAMOTIDINE 10 MG/ML
1 INJECTION INTRAVENOUS
Qty: 0 | Refills: 0 | DISCHARGE
Start: 2024-01-18

## 2024-01-18 RX ORDER — ENTECAVIR 0.5 MG/1
1 TABLET ORAL
Qty: 0 | Refills: 0 | DISCHARGE
Start: 2024-01-18

## 2024-01-18 RX ORDER — ASPIRIN/CALCIUM CARB/MAGNESIUM 324 MG
1 TABLET ORAL
Refills: 0 | DISCHARGE

## 2024-01-18 RX ORDER — LANOLIN ALCOHOL/MO/W.PET/CERES
1 CREAM (GRAM) TOPICAL
Qty: 0 | Refills: 0 | DISCHARGE
Start: 2024-01-18

## 2024-01-18 RX ORDER — LIDOCAINE 4 G/100G
1 CREAM TOPICAL
Qty: 0 | Refills: 0 | DISCHARGE
Start: 2024-01-18

## 2024-01-18 RX ORDER — FAMOTIDINE 10 MG/ML
1 INJECTION INTRAVENOUS
Refills: 0 | DISCHARGE

## 2024-01-18 RX ORDER — POLYETHYLENE GLYCOL 3350 17 G/17G
17 POWDER, FOR SOLUTION ORAL
Qty: 0 | Refills: 0 | DISCHARGE
Start: 2024-01-18

## 2024-01-18 RX ORDER — ENTECAVIR 0.5 MG/1
1 TABLET ORAL
Refills: 0 | DISCHARGE

## 2024-01-18 RX ORDER — ATORVASTATIN CALCIUM 80 MG/1
1 TABLET, FILM COATED ORAL
Qty: 0 | Refills: 0 | DISCHARGE
Start: 2024-01-18

## 2024-01-18 RX ORDER — ROSUVASTATIN CALCIUM 5 MG/1
1 TABLET ORAL
Refills: 0 | DISCHARGE

## 2024-01-18 RX ORDER — ASPIRIN/CALCIUM CARB/MAGNESIUM 324 MG
1 TABLET ORAL
Qty: 0 | Refills: 0 | DISCHARGE
Start: 2024-01-18

## 2024-01-18 RX ADMIN — LIDOCAINE 1 PATCH: 4 CREAM TOPICAL at 13:53

## 2024-01-18 RX ADMIN — FAMOTIDINE 20 MILLIGRAM(S): 10 INJECTION INTRAVENOUS at 11:15

## 2024-01-18 RX ADMIN — HEPARIN SODIUM 5000 UNIT(S): 5000 INJECTION INTRAVENOUS; SUBCUTANEOUS at 07:50

## 2024-01-18 RX ADMIN — Medication 81 MILLIGRAM(S): at 11:14

## 2024-01-18 NOTE — PROGRESS NOTE ADULT - PROBLEM SELECTOR PLAN 6
- continue entecavir

## 2024-01-18 NOTE — PROGRESS NOTE ADULT - NUTRITIONAL ASSESSMENT
This patient has been assessed with a concern for Malnutrition and has been determined to have a diagnosis/diagnoses of Underweight (BMI < 19).    This patient is being managed with:   Diet Regular-  Entered: Kwame 10 2024 12:35AM  
This patient has been assessed with a concern for Malnutrition and has been determined to have a diagnosis/diagnoses of Underweight (BMI < 19).    This patient is being managed with:   Diet Regular-  Entered: Kwame 10 2024 12:35AM

## 2024-01-18 NOTE — PROGRESS NOTE ADULT - PROBLEM SELECTOR PLAN 7
DVT ppx: SQH  Diet: DASH  Dispo: MELISSA per PT, daughter agreeable  plan of care d/w pt and ACP DVT ppx: SQH  Diet: DASH  Dispo: MELISSA per PT, daughter agreeable, plan of care d/w daughter in detail, recommend outpt f/u with ortho and outpt MRI    DC to rehab today   Patient hemodynamically stable for discharge   Time spent in discharge process is 34 min  plan of care d/w ACP and SW

## 2024-01-18 NOTE — PROGRESS NOTE ADULT - PROBLEM SELECTOR PLAN 5
- continue ASA 81 and atorvastatin 80
- continue entecavir
- continue ASA and statin
- continue ASA 81 and atorvastatin 80
- continue ASA 81 and atorvastatin 80
- continue ASA and statin
- continue ASA 81 and atorvastatin 80

## 2024-01-18 NOTE — PROGRESS NOTE ADULT - SUBJECTIVE AND OBJECTIVE BOX
Patient is a 83y old  Female who presents with a chief complaint of Knee pain (17 Jan 2024 07:55)      SUBJECTIVE / OVERNIGHT EVENTS:    MEDICATIONS  (STANDING):  aspirin enteric coated 81 milliGRAM(s) Oral daily  atorvastatin 80 milliGRAM(s) Oral at bedtime  entecavir 0.5 milliGRAM(s) Oral every 72 hours  famotidine    Tablet 20 milliGRAM(s) Oral daily  heparin   Injectable 5000 Unit(s) SubCutaneous every 12 hours  lidocaine   4% Patch 1 Patch Transdermal every 24 hours  polyethylene glycol 3350 17 Gram(s) Oral daily  sodium chloride 0.9%. 1000 milliLiter(s) (50 mL/Hr) IV Continuous <Continuous>    MEDICATIONS  (PRN):  acetaminophen     Tablet .. 650 milliGRAM(s) Oral every 6 hours PRN Temp greater or equal to 38C (100.4F), Mild Pain (1 - 3)  benzonatate 100 milliGRAM(s) Oral three times a day PRN Cough  guaiFENesin  milliGRAM(s) Oral every 12 hours PRN Cough  melatonin 3 milliGRAM(s) Oral at bedtime PRN Insomnia      Vital Signs Last 24 Hrs  T(C): 37.2 (18 Jan 2024 08:02), Max: 37.2 (18 Jan 2024 08:02)  T(F): 98.9 (18 Jan 2024 08:02), Max: 98.9 (18 Jan 2024 08:02)  HR: 90 (18 Jan 2024 08:02) (90 - 93)  BP: 127/41 (18 Jan 2024 08:02) (117/47 - 127/41)  BP(mean): --  RR: 18 (18 Jan 2024 08:02) (18 - 18)  SpO2: 99% (18 Jan 2024 08:02) (97% - 99%)    Parameters below as of 18 Jan 2024 08:02  Patient On (Oxygen Delivery Method): room air      CAPILLARY BLOOD GLUCOSE        I&O's Summary      PHYSICAL EXAM:  GENERAL: NAD, well-developed  HEAD:  Atraumatic, Normocephalic  EYES: EOMI, PERRLA, conjunctiva and sclera clear  NECK: Supple, No JVD  CHEST/LUNG: Clear to auscultation bilaterally; No wheeze  HEART: Regular rate and rhythm; No murmurs, rubs, or gallops  ABDOMEN: Soft, Nontender, Nondistended; Bowel sounds present  EXTREMITIES:  2+ Peripheral Pulses, No clubbing, cyanosis, or edema  PSYCH: AAOx3  NEUROLOGY: non-focal  SKIN: No rashes or lesions    LABS:    01-17    132<L>  |  96<L>  |  25<H>  ----------------------------<  193<H>  4.4   |  24  |  1.54<H>    Ca    9.0      17 Jan 2024 11:39  Phos  3.3     01-17  Mg     2.20     01-17            Urinalysis Basic - ( 17 Jan 2024 11:39 )    Color: x / Appearance: x / SG: x / pH: x  Gluc: 193 mg/dL / Ketone: x  / Bili: x / Urobili: x   Blood: x / Protein: x / Nitrite: x   Leuk Esterase: x / RBC: x / WBC x   Sq Epi: x / Non Sq Epi: x / Bacteria: x        RADIOLOGY & ADDITIONAL TESTS:    Imaging Personally Reviewed:    Consultant(s) Notes Reviewed:      Care Discussed with Consultants/Other Providers:   Patient is a 83y old  Female who presents with a chief complaint of Knee pain (17 Jan 2024 07:55)      SUBJECTIVE / OVERNIGHT EVENTS: patient seen and examined by bedside, pt still has mild pain in Rt knee , denies headache, dizziness, SOB, CP, Palpitations , N/V/D, abdominal pain        MEDICATIONS  (STANDING):  aspirin enteric coated 81 milliGRAM(s) Oral daily  atorvastatin 80 milliGRAM(s) Oral at bedtime  entecavir 0.5 milliGRAM(s) Oral every 72 hours  famotidine    Tablet 20 milliGRAM(s) Oral daily  heparin   Injectable 5000 Unit(s) SubCutaneous every 12 hours  lidocaine   4% Patch 1 Patch Transdermal every 24 hours  polyethylene glycol 3350 17 Gram(s) Oral daily  sodium chloride 0.9%. 1000 milliLiter(s) (50 mL/Hr) IV Continuous <Continuous>    MEDICATIONS  (PRN):  acetaminophen     Tablet .. 650 milliGRAM(s) Oral every 6 hours PRN Temp greater or equal to 38C (100.4F), Mild Pain (1 - 3)  benzonatate 100 milliGRAM(s) Oral three times a day PRN Cough  guaiFENesin  milliGRAM(s) Oral every 12 hours PRN Cough  melatonin 3 milliGRAM(s) Oral at bedtime PRN Insomnia      Vital Signs Last 24 Hrs  T(C): 37.2 (18 Jan 2024 08:02), Max: 37.2 (18 Jan 2024 08:02)  T(F): 98.9 (18 Jan 2024 08:02), Max: 98.9 (18 Jan 2024 08:02)  HR: 90 (18 Jan 2024 08:02) (90 - 93)  BP: 127/41 (18 Jan 2024 08:02) (117/47 - 127/41)  BP(mean): --  RR: 18 (18 Jan 2024 08:02) (18 - 18)  SpO2: 99% (18 Jan 2024 08:02) (97% - 99%)    Parameters below as of 18 Jan 2024 08:02  Patient On (Oxygen Delivery Method): room air      CAPILLARY BLOOD GLUCOSE        I&O's Summary    PHYSICAL EXAM:    General: No acute distress noted     Eyes: PERRL, EOMI     ENT: MMM, no oropharyngeal lesions or erythema appreciated     Pulm: No increased WOB. CTAB. No wheezing.     CV: RRR. S1&S2+. No M/R/G appreciated.     Abdomen: +BS. Soft, NTND. No organomegaly.     MSK: Nml ROM, no joint swelling or tenderness on palpation    Extremities: No peripheral edema or cyanosis.    Neuro: A&Ox3, no focal deficits     Skin: Warm and dry. No visible rash.       LABS:    01-17    132<L>  |  96<L>  |  25<H>  ----------------------------<  193<H>  4.4   |  24  |  1.54<H>    Ca    9.0      17 Jan 2024 11:39  Phos  3.3     01-17  Mg     2.20     01-17            Urinalysis Basic - ( 17 Jan 2024 11:39 )    Color: x / Appearance: x / SG: x / pH: x  Gluc: 193 mg/dL / Ketone: x  / Bili: x / Urobili: x   Blood: x / Protein: x / Nitrite: x   Leuk Esterase: x / RBC: x / WBC x   Sq Epi: x / Non Sq Epi: x / Bacteria: x        RADIOLOGY & ADDITIONAL TESTS:    Imaging Personally Reviewed:    Consultant(s) Notes Reviewed:      Care Discussed with Consultants/Other Providers:

## 2024-01-18 NOTE — PROGRESS NOTE ADULT - PROBLEM SELECTOR PROBLEM 3
Stage 3 chronic kidney disease
Bilateral knee pain
Bilateral knee pain
Stage 3 chronic kidney disease
Bilateral knee pain
KALYAN (acute kidney injury)
Stage 3 chronic kidney disease

## 2024-01-18 NOTE — PROGRESS NOTE ADULT - PROBLEM SELECTOR PLAN 1
2/2 osteoarthritis, possibly exacerbated by weakness iso of recent COVID. No recent trauma/falls. No s/s of systemic infection.   - dsDNA, RF wnl   - XR b/l knee/pelvis: Left knee effusion. No right knee effusion. No fractures or dislocations. Intra-articular tibiofemoral chondrocalcinosis. Tricompartment osteoarthritic changes. No joint margin erosions. Generalized osteopenia otherwise no discrete lytic or blastic lesions. Posteromedial distal left thigh surgical clips.  -CT Pelvis: No fracture. Degenerative changes. Small erosion at the left adductor insertion at the left anterior greater   trochanter. Possibly the sequela of remote injury or inflammatory arthropathy.  Plan:   - DONTAE positive, CCP AB neg  - lidocaine patch, acetaminophen for pain ctrl. Would likely derive benefit from Voltaren gel, but not on hospital formulary. No po NSAIDs due to CKD  - MR knees b/l ordered  - Outpatient ortho f/u. Daughter states patient would not agree to knee replacement if offered 2/2 osteoarthritis, possibly exacerbated by weakness iso of recent COVID. No recent trauma/falls. No s/s of systemic infection.   - dsDNA, RF wnl   - XR b/l knee/pelvis: Left knee effusion. No right knee effusion. No fractures or dislocations. Intra-articular tibiofemoral chondrocalcinosis. Tricompartment osteoarthritic changes. No joint margin erosions. Generalized osteopenia otherwise no discrete lytic or blastic lesions. Posteromedial distal left thigh surgical clips.  -CT Pelvis: No fracture. Degenerative changes. Small erosion at the left adductor insertion at the left anterior greater   trochanter. Possibly the sequela of remote injury or inflammatory arthropathy.  Plan:   - DONTAE positive, CCP AB neg  - lidocaine patch, acetaminophen for pain ctrl. Would likely derive benefit from Voltaren gel, but not on hospital formulary. No po NSAIDs due to CKD  - MR knees b/l ordered, case d/w pt and daughter, pt got auth and has bed in Rehab, MRI can be done as outpatient , pt and daughter in agreement    - Outpatient ortho f/u. Daughter states patient would not agree to knee replacement if offered

## 2024-01-18 NOTE — PROGRESS NOTE ADULT - PROBLEM SELECTOR PLAN 3
Cr stable, unclear baseline,   -Follows with Leela Em as outpatient   -Strict I/Os  -Avoid nephrotoxic agents  -Renally dose meds

## 2024-01-18 NOTE — PROGRESS NOTE ADULT - PROBLEM SELECTOR PROBLEM 4
Hyponatremia
CAD (coronary artery disease)
Hyponatremia
Hyponatremia